# Patient Record
Sex: MALE | Race: WHITE | Employment: OTHER | URBAN - METROPOLITAN AREA
[De-identification: names, ages, dates, MRNs, and addresses within clinical notes are randomized per-mention and may not be internally consistent; named-entity substitution may affect disease eponyms.]

---

## 2017-02-03 ENCOUNTER — HOSPITAL ENCOUNTER (OUTPATIENT)
Dept: PREADMISSION TESTING | Age: 67
Discharge: HOME | End: 2017-02-03
Payer: MEDICARE

## 2017-02-03 DIAGNOSIS — M75.102 ROTATOR CUFF SYNDROME OF LEFT SHOULDER: ICD-10-CM

## 2017-02-03 LAB
ATRIAL RATE: 61 BPM
CALCULATED P AXIS, ECG09: 28 DEGREES
CALCULATED R AXIS, ECG10: 49 DEGREES
CALCULATED T AXIS, ECG11: 131 DEGREES
DIAGNOSIS, 93000: NORMAL
P-R INTERVAL, ECG05: 204 MS
Q-T INTERVAL, ECG07: 408 MS
QRS DURATION, ECG06: 82 MS
QTC CALCULATION (BEZET), ECG08: 410 MS
VENTRICULAR RATE, ECG03: 61 BPM

## 2017-02-03 PROCEDURE — 93005 ELECTROCARDIOGRAM TRACING: CPT

## 2017-02-21 ENCOUNTER — HOSPITAL ENCOUNTER (OUTPATIENT)
Dept: NON INVASIVE DIAGNOSTICS | Age: 67
Discharge: HOME | End: 2017-02-21
Attending: INTERNAL MEDICINE
Payer: MEDICARE

## 2017-02-21 DIAGNOSIS — Z01.810 PRE-OPERATIVE CARDIOVASCULAR EXAMINATION: ICD-10-CM

## 2017-02-21 PROCEDURE — 93306 TTE W/DOPPLER COMPLETE: CPT

## 2017-12-28 ENCOUNTER — HOSPITAL ENCOUNTER (EMERGENCY)
Age: 67
Discharge: HOME | End: 2017-12-28
Attending: EMERGENCY MEDICINE | Admitting: EMERGENCY MEDICINE
Payer: MEDICARE

## 2017-12-28 VITALS
SYSTOLIC BLOOD PRESSURE: 132 MMHG | HEART RATE: 106 BPM | HEIGHT: 70 IN | BODY MASS INDEX: 24.34 KG/M2 | WEIGHT: 170 LBS | TEMPERATURE: 97.7 F | OXYGEN SATURATION: 100 % | RESPIRATION RATE: 18 BRPM | DIASTOLIC BLOOD PRESSURE: 65 MMHG

## 2017-12-28 DIAGNOSIS — L02.512 ABSCESS OF FINGER OF LEFT HAND: Primary | ICD-10-CM

## 2017-12-28 PROCEDURE — 87077 CULTURE AEROBIC IDENTIFY: CPT | Performed by: EMERGENCY MEDICINE

## 2017-12-28 PROCEDURE — 74011250637 HC RX REV CODE- 250/637: Performed by: EMERGENCY MEDICINE

## 2017-12-28 PROCEDURE — 87070 CULTURE OTHR SPECIMN AEROBIC: CPT | Performed by: EMERGENCY MEDICINE

## 2017-12-28 PROCEDURE — 74011000250 HC RX REV CODE- 250: Performed by: EMERGENCY MEDICINE

## 2017-12-28 PROCEDURE — 99283 EMERGENCY DEPT VISIT LOW MDM: CPT

## 2017-12-28 PROCEDURE — 75810000289 HC I&D ABSCESS SIMP/COMP/MULT

## 2017-12-28 PROCEDURE — 87186 SC STD MICRODIL/AGAR DIL: CPT | Performed by: EMERGENCY MEDICINE

## 2017-12-28 RX ORDER — ESOMEPRAZOLE MAGNESIUM 40 MG/1
CAPSULE, DELAYED RELEASE ORAL AS NEEDED
COMMUNITY

## 2017-12-28 RX ORDER — ATORVASTATIN CALCIUM 20 MG/1
20 TABLET, FILM COATED ORAL DAILY
Status: ON HOLD | COMMUNITY
End: 2022-08-09

## 2017-12-28 RX ORDER — DOXYCYCLINE 100 MG/1
100 CAPSULE ORAL 2 TIMES DAILY
Qty: 14 CAP | Refills: 0 | Status: SHIPPED | OUTPATIENT
Start: 2017-12-28 | End: 2018-01-04

## 2017-12-28 RX ORDER — FLUTICASONE PROPIONATE 50 MCG
2 SPRAY, SUSPENSION (ML) NASAL DAILY
Status: ON HOLD | COMMUNITY
End: 2022-08-08 | Stop reason: CLARIF

## 2017-12-28 RX ORDER — DOXYCYCLINE 100 MG/1
100 CAPSULE ORAL
Status: COMPLETED | OUTPATIENT
Start: 2017-12-28 | End: 2017-12-28

## 2017-12-28 RX ORDER — DEXTROMETHORPHAN HYDROBROMIDE, GUAIFENESIN 5; 100 MG/5ML; MG/5ML
650 LIQUID ORAL EVERY 8 HOURS
COMMUNITY

## 2017-12-28 RX ORDER — LIDOCAINE HYDROCHLORIDE 10 MG/ML
10 INJECTION INFILTRATION; PERINEURAL ONCE
Status: COMPLETED | OUTPATIENT
Start: 2017-12-28 | End: 2017-12-28

## 2017-12-28 RX ORDER — HYDROCODONE BITARTRATE AND ACETAMINOPHEN 5; 325 MG/1; MG/1
1 TABLET ORAL
Qty: 12 TAB | Refills: 0 | Status: ON HOLD | OUTPATIENT
Start: 2017-12-28 | End: 2022-08-09

## 2017-12-28 RX ADMIN — LIDOCAINE HYDROCHLORIDE 10 ML: 10 INJECTION, SOLUTION INFILTRATION; PERINEURAL at 08:15

## 2017-12-28 RX ADMIN — DOXYCYCLINE 100 MG: 100 CAPSULE ORAL at 08:23

## 2017-12-28 NOTE — DISCHARGE INSTRUCTIONS

## 2017-12-28 NOTE — ED TRIAGE NOTES
Patient c/o LEFT pointer finger pain that started [place]. Swelling and redness noted to site. Patient reports noting clear discharge and have been using bacitracin.

## 2017-12-28 NOTE — ED PROVIDER NOTES
EMERGENCY DEPARTMENT HISTORY AND PHYSICAL EXAM    Date: 12/28/2017  Patient Name: Lisa Lopez    History of Presenting Illness     Chief Complaint   Patient presents with    Finger Pain         History Provided By: Patient    Chief Complaint: iindex finger pain  Duration: 4 Days  Timing:  Worsening  Location: left  Quality: Aching and throbbinf  Severity: 4 out of 10  Modifying Factors: Pain is worse with movement. Associated Symptoms: edema, erythema, and clear drainage    Additional History (Context):   7:55 AM  Lisa Lopez is a 69 y.o. male who presents to the emergency department C/O 4/10 throbbing,aching left index finger pain onset 4 days ago. Patient noticed a \"bump\" on left index finger 4 days ago that has gradually worsened. Pain is worse with movement. Took bacitracin. Associated symptoms include edema, erythema, and clear drainage. Pshx of rotator cuff surgery on left shoulder. NKDA. Pt denies any other Sx or complaints. PCP: Saw Jimenez MD    Past History     Past Medical History:  History reviewed. No pertinent past medical history. Past Surgical History:  Past Surgical History:   Procedure Laterality Date    HX BACK SURGERY         Family History:  History reviewed. No pertinent family history. Social History:  Social History   Substance Use Topics    Smoking status: Never Smoker    Smokeless tobacco: Never Used    Alcohol use Yes       Allergies:  No Known Allergies      Review of Systems   Review of Systems   Musculoskeletal: Positive for myalgias (left index finger pain). Skin: Positive for color change (erythema). (+) edema  (+) clear drainage   All other systems reviewed and are negative. Physical Exam     Vitals:    12/28/17 0803   BP: 132/65   Pulse: (!) 106   Resp: 18   Temp: 97.7 °F (36.5 °C)   SpO2: 100%   Weight: 77.1 kg (170 lb)   Height: 5' 10\" (1.778 m)     Physical Exam   Constitutional: He is oriented to person, place, and time.  He appears well-developed and well-nourished. HENT:   Head: Normocephalic and atraumatic. Eyes: Pupils are equal, round, and reactive to light. Neck: Neck supple. Cardiovascular: Normal rate, regular rhythm, S1 normal, S2 normal and normal heart sounds. Pulmonary/Chest: Breath sounds normal. No respiratory distress. He has no wheezes. He has no rales. He exhibits no tenderness. Abdominal: Soft. He exhibits no distension and no mass. There is no tenderness. There is no guarding. Musculoskeletal: Normal range of motion. He exhibits no edema or tenderness. Left hand: He exhibits normal capillary refill. Neurological: He is alert and oriented to person, place, and time. No cranial nerve deficit. Skin: No rash noted. Left index finger- small abscess . 25cm on dorsal proximal. Normal capillary refill. Pin point central area that is pustular   Psychiatric: He has a normal mood and affect. His behavior is normal. Thought content normal.   Nursing note and vitals reviewed. Diagnostic Study Results     Labs -   No results found for this or any previous visit (from the past 12 hour(s)). Radiologic Studies -   No orders to display     CT Results  (Last 48 hours)    None        CXR Results  (Last 48 hours)    None          Medications given in the ED-  Medications   lidocaine (XYLOCAINE) 10 mg/mL (1 %) injection 10 mL (10 mL IntraDERMal Given 12/28/17 8215)   doxycycline (MONODOX) capsule 100 mg (100 mg Oral Given 12/28/17 5208)         Medical Decision Making   I am the first provider for this patient. I reviewed the vital signs, available nursing notes, past medical history, past surgical history, family history and social history. Vital Signs-Reviewed the patient's vital signs.     Pulse Oximetry Analysis - 100% on RA      Records Reviewed: Nursing Notes    Procedures:  I&D Abcess Simple  Date/Time: 12/28/2017 8:05 AM  Performed by: Amna Gresham  Authorized by: Amna Gresham Consent:     Consent obtained:  Verbal    Consent given by:  Patient    Risks discussed:  Bleeding, damage to other organs, infection, incomplete drainage and pain    Alternatives discussed:  No treatment  Location:     Type:  Abscess    Size:  .25 cm    Location:  Upper extremity    Upper extremity location:  Finger    Finger location:  L index finger  Procedure type:     Complexity:  Simple  Procedure details:     Needle aspiration: no      Incision types:  Stab incision    Incision depth:  Dermal    Scalpel blade:  11    Wound management:  Irrigated with saline    Drainage:  Purulent    Drainage amount:  Scant    Wound treatment:  Wound left open    Packing materials:  None  Post-procedure details:     Patient tolerance of procedure: Tolerated well, no immediate complications  Comments:      Used ½ cc of 1% lidocaine w/o epi. Culture was sent. ED Course:   7:55 AM Initial assessment performed. The patients presenting problems have been discussed, and they are in agreement with the care plan formulated and outlined with them. I have encouraged them to ask questions as they arise throughout their visit. Diagnosis and Disposition       DISCHARGE NOTE:  8:20 AM  Ulices Mccann's  results have been reviewed with him. He has been counseled regarding his diagnosis, treatment, and plan. He verbally conveys understanding and agreement of the signs, symptoms, diagnosis, treatment and prognosis and additionally agrees to follow up as discussed. He also agrees with the care-plan and conveys that all of his questions have been answered. I have also provided discharge instructions for him that include: educational information regarding their diagnosis and treatment, and list of reasons why they would want to return to the ED prior to their follow-up appointment, should his condition change. He has been provided with education for proper emergency department utilization. CLINICAL IMPRESSION:    1. Abscess of finger of left hand        PLAN:  1. D/C Home  2. Discharge Medication List as of 12/28/2017  8:17 AM      START taking these medications    Details   doxycycline (MONODOX) 100 mg capsule Take 1 Cap by mouth two (2) times a day for 7 days. , Print, Disp-14 Cap, R-0      HYDROcodone-acetaminophen (NORCO) 5-325 mg per tablet Take 1 Tab by mouth every four (4) hours as needed for Pain. Max Daily Amount: 6 Tabs., Print, Disp-12 Tab, R-0         CONTINUE these medications which have NOT CHANGED    Details   atorvastatin (LIPITOR) 20 mg tablet Take  by mouth daily. , Historical Med      fluticasone (FLONASE) 50 mcg/actuation nasal spray 2 Sprays by Both Nostrils route daily. , Historical Med      esomeprazole (NEXIUM) 40 mg capsule Take  by mouth as needed., Historical Med      acetaminophen (TYLENOL ARTHRITIS PAIN) 650 mg TbER Take 650 mg by mouth every eight (8) hours. , Historical Med           3. Follow-up Information     Follow up With Details Comments Contact Info    Mila Muñoz MD Schedule an appointment as soon as possible for a visit For primary care follow up [address]      THE [facility] EMERGENCY DEPT Go to As needed, as symptoms worsen [address] [place] [id number]  [phone]        _______________________________    Attestations: This note is prepared by Donald Muniz, acting as Scribe for [employer], MD.    [employer], MD:  The scribe's documentation has been prepared under my direction and personally reviewed by me in its entirety.   I confirm that the note above accurately reflects all work, treatment, procedures, and medical decision making performed by me.  _______________________________

## 2017-12-28 NOTE — ED NOTES
Patient given & reviewed discharge instructions. Patient return verbal understanding. Armband discarded & shredded appropriately.

## 2017-12-30 LAB
BACTERIA SPEC CULT: ABNORMAL
GRAM STN SPEC: ABNORMAL
GRAM STN SPEC: ABNORMAL
SERVICE CMNT-IMP: ABNORMAL

## 2017-12-30 NOTE — CALL BACK NOTE
4:52 PM  12/30/2017    On doxy for skin infection. Sensitive to tetracycline per C&S. On appropriate tx.      Yady Colin PA-C

## 2022-08-06 ENCOUNTER — HOSPITAL ENCOUNTER (INPATIENT)
Age: 72
LOS: 5 days | Discharge: HOME | DRG: 247 | End: 2022-08-11
Attending: EMERGENCY MEDICINE | Admitting: INTERNAL MEDICINE
Payer: MEDICARE

## 2022-08-06 ENCOUNTER — APPOINTMENT (OUTPATIENT)
Dept: GENERAL RADIOLOGY | Age: 72
DRG: 247 | End: 2022-08-06
Attending: EMERGENCY MEDICINE
Payer: MEDICARE

## 2022-08-06 DIAGNOSIS — R07.9 CHEST PAIN, UNSPECIFIED TYPE: ICD-10-CM

## 2022-08-06 DIAGNOSIS — I21.4 NSTEMI (NON-ST ELEVATED MYOCARDIAL INFARCTION) (HCC): Primary | ICD-10-CM

## 2022-08-06 PROBLEM — R73.01 IMPAIRED FASTING GLUCOSE: Status: ACTIVE | Noted: 2022-08-06

## 2022-08-06 PROBLEM — L57.0 ACTINIC KERATOSIS: Status: ACTIVE | Noted: 2022-08-06

## 2022-08-06 PROBLEM — Z19.1 HORMONE SENSITIVE PROSTATE CANCER (HCC): Status: RESOLVED | Noted: 2022-08-06 | Resolved: 2022-08-06

## 2022-08-06 PROBLEM — K21.9 ESOPHAGEAL REFLUX: Status: ACTIVE | Noted: 2022-08-06

## 2022-08-06 PROBLEM — Z19.1 HORMONE SENSITIVE PROSTATE CANCER (HCC): Status: ACTIVE | Noted: 2022-08-06

## 2022-08-06 PROBLEM — I82.409 DVT (DEEP VENOUS THROMBOSIS) (HCC): Status: ACTIVE | Noted: 2022-08-06

## 2022-08-06 PROBLEM — C61 HORMONE SENSITIVE PROSTATE CANCER (HCC): Status: RESOLVED | Noted: 2022-08-06 | Resolved: 2022-08-06

## 2022-08-06 PROBLEM — D75.1 POLYCYTHEMIA: Status: ACTIVE | Noted: 2022-08-06

## 2022-08-06 PROBLEM — E78.5 HYPERLIPIDEMIA: Status: ACTIVE | Noted: 2022-08-06

## 2022-08-06 PROBLEM — C61 PROSTATE CANCER (HCC): Status: ACTIVE | Noted: 2022-08-06

## 2022-08-06 PROBLEM — C61 HORMONE SENSITIVE PROSTATE CANCER (HCC): Status: ACTIVE | Noted: 2022-08-06

## 2022-08-06 LAB
ALBUMIN SERPL-MCNC: 3.9 G/DL (ref 3.4–5)
ALBUMIN/GLOB SERPL: 1.1 {RATIO} (ref 0.8–1.7)
ALP SERPL-CCNC: 38 U/L (ref 45–117)
ALT SERPL-CCNC: 43 U/L (ref 16–61)
ANION GAP SERPL CALC-SCNC: 7 MMOL/L (ref 3–18)
APTT PPP: 27.2 SEC (ref 23–36.4)
AST SERPL-CCNC: 70 U/L (ref 10–38)
BASOPHILS # BLD: 0.1 K/UL (ref 0–0.1)
BASOPHILS NFR BLD: 1 % (ref 0–2)
BILIRUB SERPL-MCNC: 0.5 MG/DL (ref 0.2–1)
BUN SERPL-MCNC: 21 MG/DL (ref 7–18)
BUN/CREAT SERPL: 17 (ref 12–20)
CALCIUM SERPL-MCNC: 9.1 MG/DL (ref 8.5–10.1)
CHLORIDE SERPL-SCNC: 102 MMOL/L (ref 100–111)
CO2 SERPL-SCNC: 27 MMOL/L (ref 21–32)
COVID-19 RAPID TEST, COVR: NOT DETECTED
CREAT SERPL-MCNC: 1.27 MG/DL (ref 0.6–1.3)
D DIMER PPP FEU-MCNC: <0.27 UG/ML(FEU)
DIFFERENTIAL METHOD BLD: ABNORMAL
EOSINOPHIL # BLD: 0.1 K/UL (ref 0–0.4)
EOSINOPHIL NFR BLD: 1 % (ref 0–5)
ERYTHROCYTE [DISTWIDTH] IN BLOOD BY AUTOMATED COUNT: 13.1 % (ref 11.6–14.5)
GLOBULIN SER CALC-MCNC: 3.7 G/DL (ref 2–4)
GLUCOSE SERPL-MCNC: 107 MG/DL (ref 74–99)
HCT VFR BLD AUTO: 50.4 % (ref 36–48)
HGB BLD-MCNC: 17.1 G/DL (ref 13–16)
IMM GRANULOCYTES # BLD AUTO: 0 K/UL (ref 0–0.04)
IMM GRANULOCYTES NFR BLD AUTO: 0 % (ref 0–0.5)
INR PPP: 1 (ref 0.8–1.2)
LYMPHOCYTES # BLD: 1.8 K/UL (ref 0.9–3.6)
LYMPHOCYTES NFR BLD: 21 % (ref 21–52)
MCH RBC QN AUTO: 30.9 PG (ref 24–34)
MCHC RBC AUTO-ENTMCNC: 33.9 G/DL (ref 31–37)
MCV RBC AUTO: 91.1 FL (ref 78–100)
MONOCYTES # BLD: 0.8 K/UL (ref 0.05–1.2)
MONOCYTES NFR BLD: 10 % (ref 3–10)
NEUTS SEG # BLD: 5.6 K/UL (ref 1.8–8)
NEUTS SEG NFR BLD: 67 % (ref 40–73)
NRBC # BLD: 0 K/UL (ref 0–0.01)
NRBC BLD-RTO: 0 PER 100 WBC
PLATELET # BLD AUTO: 162 K/UL (ref 135–420)
PMV BLD AUTO: 9.5 FL (ref 9.2–11.8)
POTASSIUM SERPL-SCNC: 3.9 MMOL/L (ref 3.5–5.5)
PROT SERPL-MCNC: 7.6 G/DL (ref 6.4–8.2)
PROTHROMBIN TIME: 13.6 SEC (ref 11.5–15.2)
RBC # BLD AUTO: 5.53 M/UL (ref 4.35–5.65)
SODIUM SERPL-SCNC: 136 MMOL/L (ref 136–145)
SOURCE, COVRS: NORMAL
TROPONIN-HIGH SENSITIVITY: 3355 NG/L (ref 0–78)
TSH SERPL DL<=0.05 MIU/L-ACNC: 0.84 UIU/ML (ref 0.36–3.74)
WBC # BLD AUTO: 8.4 K/UL (ref 4.6–13.2)

## 2022-08-06 PROCEDURE — 80053 COMPREHEN METABOLIC PANEL: CPT

## 2022-08-06 PROCEDURE — 85025 COMPLETE CBC W/AUTO DIFF WBC: CPT

## 2022-08-06 PROCEDURE — 74011250636 HC RX REV CODE- 250/636: Performed by: INTERNAL MEDICINE

## 2022-08-06 PROCEDURE — 87635 SARS-COV-2 COVID-19 AMP PRB: CPT

## 2022-08-06 PROCEDURE — 65270000046 HC RM TELEMETRY

## 2022-08-06 PROCEDURE — 74011250637 HC RX REV CODE- 250/637: Performed by: EMERGENCY MEDICINE

## 2022-08-06 PROCEDURE — 71045 X-RAY EXAM CHEST 1 VIEW: CPT

## 2022-08-06 PROCEDURE — 85379 FIBRIN DEGRADATION QUANT: CPT

## 2022-08-06 PROCEDURE — 84484 ASSAY OF TROPONIN QUANT: CPT

## 2022-08-06 PROCEDURE — 93005 ELECTROCARDIOGRAM TRACING: CPT

## 2022-08-06 PROCEDURE — 74011250637 HC RX REV CODE- 250/637: Performed by: INTERNAL MEDICINE

## 2022-08-06 PROCEDURE — 85730 THROMBOPLASTIN TIME PARTIAL: CPT

## 2022-08-06 PROCEDURE — 74011250636 HC RX REV CODE- 250/636: Performed by: EMERGENCY MEDICINE

## 2022-08-06 PROCEDURE — 99285 EMERGENCY DEPT VISIT HI MDM: CPT

## 2022-08-06 PROCEDURE — 85610 PROTHROMBIN TIME: CPT

## 2022-08-06 PROCEDURE — 84443 ASSAY THYROID STIM HORMONE: CPT

## 2022-08-06 RX ORDER — METOPROLOL SUCCINATE 25 MG/1
25 TABLET, EXTENDED RELEASE ORAL
Status: COMPLETED | OUTPATIENT
Start: 2022-08-06 | End: 2022-08-06

## 2022-08-06 RX ORDER — HEPARIN SODIUM 1000 [USP'U]/ML
4000 INJECTION, SOLUTION INTRAVENOUS; SUBCUTANEOUS ONCE
Status: COMPLETED | OUTPATIENT
Start: 2022-08-06 | End: 2022-08-07

## 2022-08-06 RX ORDER — ADHESIVE BANDAGE
30 BANDAGE TOPICAL DAILY PRN
Status: DISCONTINUED | OUTPATIENT
Start: 2022-08-06 | End: 2022-08-11 | Stop reason: HOSPADM

## 2022-08-06 RX ORDER — ONDANSETRON 4 MG/1
4 TABLET, ORALLY DISINTEGRATING ORAL
Status: DISCONTINUED | OUTPATIENT
Start: 2022-08-06 | End: 2022-08-11 | Stop reason: HOSPADM

## 2022-08-06 RX ORDER — DOCUSATE SODIUM 100 MG/1
100 CAPSULE, LIQUID FILLED ORAL 2 TIMES DAILY
Status: DISCONTINUED | OUTPATIENT
Start: 2022-08-06 | End: 2022-08-11 | Stop reason: HOSPADM

## 2022-08-06 RX ORDER — HEPARIN SODIUM 10000 [USP'U]/100ML
12-25 INJECTION, SOLUTION INTRAVENOUS
Status: DISCONTINUED | OUTPATIENT
Start: 2022-08-06 | End: 2022-08-06

## 2022-08-06 RX ORDER — SODIUM CHLORIDE 0.9 % (FLUSH) 0.9 %
5-40 SYRINGE (ML) INJECTION AS NEEDED
Status: DISCONTINUED | OUTPATIENT
Start: 2022-08-06 | End: 2022-08-11 | Stop reason: HOSPADM

## 2022-08-06 RX ORDER — ATORVASTATIN CALCIUM 20 MG/1
20 TABLET, FILM COATED ORAL
Status: DISCONTINUED | OUTPATIENT
Start: 2022-08-06 | End: 2022-08-08

## 2022-08-06 RX ORDER — ONDANSETRON 2 MG/ML
4 INJECTION INTRAMUSCULAR; INTRAVENOUS
Status: DISCONTINUED | OUTPATIENT
Start: 2022-08-06 | End: 2022-08-11 | Stop reason: HOSPADM

## 2022-08-06 RX ORDER — MORPHINE SULFATE 4 MG/ML
4 INJECTION INTRAVENOUS
Status: COMPLETED | OUTPATIENT
Start: 2022-08-06 | End: 2022-08-06

## 2022-08-06 RX ORDER — POLYETHYLENE GLYCOL 3350 17 G/17G
17 POWDER, FOR SOLUTION ORAL DAILY PRN
Status: DISCONTINUED | OUTPATIENT
Start: 2022-08-06 | End: 2022-08-11 | Stop reason: HOSPADM

## 2022-08-06 RX ORDER — ACETAMINOPHEN 650 MG/1
650 SUPPOSITORY RECTAL
Status: DISCONTINUED | OUTPATIENT
Start: 2022-08-06 | End: 2022-08-09

## 2022-08-06 RX ORDER — SODIUM CHLORIDE 0.9 % (FLUSH) 0.9 %
5-40 SYRINGE (ML) INJECTION EVERY 8 HOURS
Status: DISCONTINUED | OUTPATIENT
Start: 2022-08-06 | End: 2022-08-11 | Stop reason: HOSPADM

## 2022-08-06 RX ORDER — METOPROLOL TARTRATE 25 MG/1
25 TABLET, FILM COATED ORAL EVERY 12 HOURS
Status: DISCONTINUED | OUTPATIENT
Start: 2022-08-07 | End: 2022-08-08

## 2022-08-06 RX ORDER — ACETAMINOPHEN 325 MG/1
650 TABLET ORAL
Status: DISCONTINUED | OUTPATIENT
Start: 2022-08-06 | End: 2022-08-09

## 2022-08-06 RX ORDER — GUAIFENESIN 100 MG/5ML
81 LIQUID (ML) ORAL DAILY
Status: DISCONTINUED | OUTPATIENT
Start: 2022-08-06 | End: 2022-08-09

## 2022-08-06 RX ORDER — HEPARIN SODIUM 10000 [USP'U]/100ML
12-25 INJECTION, SOLUTION INTRAVENOUS
Status: DISCONTINUED | OUTPATIENT
Start: 2022-08-07 | End: 2022-08-09

## 2022-08-06 RX ADMIN — METOPROLOL SUCCINATE 25 MG: 25 TABLET, EXTENDED RELEASE ORAL at 21:30

## 2022-08-06 RX ADMIN — MORPHINE SULFATE 4 MG: 4 INJECTION INTRAVENOUS at 21:31

## 2022-08-06 RX ADMIN — HEPARIN SODIUM 18 UNITS/KG/HR: 10000 INJECTION, SOLUTION INTRAVENOUS at 23:47

## 2022-08-06 RX ADMIN — ASPIRIN 81 MG: 81 TABLET, CHEWABLE ORAL at 23:49

## 2022-08-06 RX ADMIN — DOCUSATE SODIUM 100 MG: 100 CAPSULE ORAL at 23:49

## 2022-08-06 NOTE — ED TRIAGE NOTES
C/O midsternal CP/ bilat arm soreness and 2 episodes of profuse sweating x 2-3 days. Endorses working in the heat today. Endorses increased fatigue with significant life event occurring this week. Hx DVT TO L calf-taking Eliquis. Febrile in triage, took 3 81mg ASA PTA.

## 2022-08-06 NOTE — Clinical Note
Aspirin Registry Question:   Aspirin was discussed with physician prior to the procedure. Aspirin was not given prior to the procedure. AS PER , BEING GIVEN.

## 2022-08-06 NOTE — Clinical Note
TRANSFER - IN REPORT:     Verbal report received from: holding rm. rn.     Report consisted of patient's Situation, Background, Assessment and   Recommendations(SBAR). Opportunity for questions and clarification was provided. Assessment completed upon patient's arrival to unit and care assumed. Patient transported with a Registered Nurse and [address] [place] [address] / [facility] my[facility].

## 2022-08-06 NOTE — Clinical Note
Status[de-identified] INPATIENT [101]   Type of Bed: Telemetry [19]   Cardiac Monitoring Required?: Yes   Inpatient Hospitalization Certified Necessary for the Following Reasons: 3.  Patient receiving treatment that can only be provided in an inpatient setting (further clarification in H&P documentation)   Admitting Diagnosis: Chest pain [750186]   Admitting Physician: Nellie Whiteside [[id number]]   Attending Physician: Nellie Whiteside [[id number]]   Estimated Length of Stay: 2 Midnights   Discharge Plan[de-identified] Home with Office Follow-up

## 2022-08-06 NOTE — Clinical Note
Contrast Dose Calculator:   Patient's age: 74.   Patient's sex: Male. Patient weight (kg) = 82.7. Creatinine level (mg/dL) = 1.12. Creatinine clearance (mL/min): 68.   Max Contrast dose per Creatinine Cl calculator = 153 mL.

## 2022-08-06 NOTE — Clinical Note
TRANSFER - OUT REPORT:     Verbal report given to: Domo Thomas RN. Report consisted of patient's Situation, Background, Assessment and   Recommendations(SBAR). Opportunity for questions and clarification was provided. Patient transported with a Registered Nurse and [address] [place] [address] / [facility].

## 2022-08-07 ENCOUNTER — APPOINTMENT (OUTPATIENT)
Dept: NON INVASIVE DIAGNOSTICS | Age: 72
DRG: 247 | End: 2022-08-07
Attending: INTERNAL MEDICINE
Payer: MEDICARE

## 2022-08-07 ENCOUNTER — APPOINTMENT (OUTPATIENT)
Dept: VASCULAR SURGERY | Age: 72
DRG: 247 | End: 2022-08-07
Attending: INTERNAL MEDICINE
Payer: MEDICARE

## 2022-08-07 LAB
ALBUMIN SERPL-MCNC: 3.4 G/DL (ref 3.4–5)
ALBUMIN/GLOB SERPL: 1 {RATIO} (ref 0.8–1.7)
ALP SERPL-CCNC: 30 U/L (ref 45–117)
ALT SERPL-CCNC: 40 U/L (ref 16–61)
ANION GAP SERPL CALC-SCNC: 7 MMOL/L (ref 3–18)
APTT PPP: 144.3 SEC (ref 23–36.4)
APTT PPP: 95.8 SEC (ref 23–36.4)
AST SERPL-CCNC: 90 U/L (ref 10–38)
ATRIAL RATE: 69 BPM
BASOPHILS # BLD: 0.1 K/UL (ref 0–0.1)
BASOPHILS NFR BLD: 1 % (ref 0–2)
BILIRUB SERPL-MCNC: 0.6 MG/DL (ref 0.2–1)
BUN SERPL-MCNC: 21 MG/DL (ref 7–18)
BUN/CREAT SERPL: 19 (ref 12–20)
CALCIUM SERPL-MCNC: 8.7 MG/DL (ref 8.5–10.1)
CALCULATED P AXIS, ECG09: 33 DEGREES
CALCULATED R AXIS, ECG10: 6 DEGREES
CALCULATED T AXIS, ECG11: 144 DEGREES
CHLORIDE SERPL-SCNC: 103 MMOL/L (ref 100–111)
CHOLEST SERPL-MCNC: 239 MG/DL
CO2 SERPL-SCNC: 28 MMOL/L (ref 21–32)
CREAT SERPL-MCNC: 1.12 MG/DL (ref 0.6–1.3)
DIAGNOSIS, 93000: NORMAL
DIFFERENTIAL METHOD BLD: ABNORMAL
ECHO AO ASC DIAM: 4.3 CM
ECHO AO ASCENDING AORTA INDEX: 2.17 CM/M2
ECHO AO ROOT DIAM: 3.9 CM
ECHO AO ROOT INDEX: 1.97 CM/M2
ECHO AV AREA PEAK VELOCITY: 3.9 CM2
ECHO AV AREA VTI: 3.7 CM2
ECHO AV AREA/BSA PEAK VELOCITY: 2 CM2/M2
ECHO AV AREA/BSA VTI: 1.9 CM2/M2
ECHO AV MEAN GRADIENT: 2 MMHG
ECHO AV MEAN VELOCITY: 0.7 M/S
ECHO AV PEAK GRADIENT: 3 MMHG
ECHO AV PEAK VELOCITY: 0.9 M/S
ECHO AV VELOCITY RATIO: 1
ECHO AV VTI: 17.1 CM
ECHO LA DIAMETER INDEX: 1.92 CM/M2
ECHO LA DIAMETER: 3.8 CM
ECHO LA TO AORTIC ROOT RATIO: 0.97
ECHO LA VOL 2C: 44 ML (ref 18–58)
ECHO LA VOL 4C: 46 ML (ref 18–58)
ECHO LA VOL BP: 48 ML (ref 18–58)
ECHO LA VOL/BSA BIPLANE: 24 ML/M2 (ref 16–34)
ECHO LA VOLUME AREA LENGTH: 50 ML
ECHO LA VOLUME INDEX A2C: 22 ML/M2 (ref 16–34)
ECHO LA VOLUME INDEX A4C: 23 ML/M2 (ref 16–34)
ECHO LA VOLUME INDEX AREA LENGTH: 25 ML/M2 (ref 16–34)
ECHO LV E' LATERAL VELOCITY: 8 CM/S
ECHO LV E' SEPTAL VELOCITY: 6 CM/S
ECHO LV EDV A2C: 87 ML
ECHO LV EDV A4C: 81 ML
ECHO LV EDV BP: 84 ML (ref 67–155)
ECHO LV EDV INDEX A4C: 41 ML/M2
ECHO LV EDV INDEX BP: 42 ML/M2
ECHO LV EDV NDEX A2C: 44 ML/M2
ECHO LV EJECTION FRACTION A2C: 41 %
ECHO LV EJECTION FRACTION A4C: 44 %
ECHO LV EJECTION FRACTION BIPLANE: 42 % (ref 55–100)
ECHO LV ESV A2C: 52 ML
ECHO LV ESV A4C: 46 ML
ECHO LV ESV BP: 49 ML (ref 22–58)
ECHO LV ESV INDEX A2C: 26 ML/M2
ECHO LV ESV INDEX A4C: 23 ML/M2
ECHO LV ESV INDEX BP: 25 ML/M2
ECHO LV FRACTIONAL SHORTENING: 31 % (ref 28–44)
ECHO LV INTERNAL DIMENSION DIASTOLE INDEX: 2.42 CM/M2
ECHO LV INTERNAL DIMENSION DIASTOLIC: 4.8 CM (ref 4.2–5.9)
ECHO LV INTERNAL DIMENSION SYSTOLIC INDEX: 1.67 CM/M2
ECHO LV INTERNAL DIMENSION SYSTOLIC: 3.3 CM
ECHO LV IVSD: 0.9 CM (ref 0.6–1)
ECHO LV MASS 2D: 181.9 G (ref 88–224)
ECHO LV MASS INDEX 2D: 91.9 G/M2 (ref 49–115)
ECHO LV POSTERIOR WALL DIASTOLIC: 1.2 CM (ref 0.6–1)
ECHO LV RELATIVE WALL THICKNESS RATIO: 0.5
ECHO LVOT AREA: 3.8 CM2
ECHO LVOT AV VTI INDEX: 0.96
ECHO LVOT DIAM: 2.2 CM
ECHO LVOT MEAN GRADIENT: 2 MMHG
ECHO LVOT PEAK GRADIENT: 3 MMHG
ECHO LVOT PEAK VELOCITY: 0.9 M/S
ECHO LVOT STROKE VOLUME INDEX: 31.5 ML/M2
ECHO LVOT SV: 62.3 ML
ECHO LVOT VTI: 16.4 CM
ECHO MV A VELOCITY: 0.42 M/S
ECHO MV E DECELERATION TIME (DT): 168.9 MS
ECHO MV E VELOCITY: 0.57 M/S
ECHO MV E/A RATIO: 1.36
ECHO MV E/E' LATERAL: 7.13
ECHO MV E/E' RATIO (AVERAGED): 8.31
ECHO MV E/E' SEPTAL: 9.5
ECHO RV FREE WALL PEAK S': 10 CM/S
ECHO RV INTERNAL DIMENSION: 2.9 CM
ECHO RV TAPSE: 1.2 CM (ref 1.7–?)
EOSINOPHIL # BLD: 0.2 K/UL (ref 0–0.4)
EOSINOPHIL NFR BLD: 2 % (ref 0–5)
ERYTHROCYTE [DISTWIDTH] IN BLOOD BY AUTOMATED COUNT: 13.2 % (ref 11.6–14.5)
GLOBULIN SER CALC-MCNC: 3.3 G/DL (ref 2–4)
GLUCOSE SERPL-MCNC: 88 MG/DL (ref 74–99)
HCT VFR BLD AUTO: 47.6 % (ref 36–48)
HDLC SERPL-MCNC: 45 MG/DL (ref 40–60)
HDLC SERPL: 5.3 {RATIO} (ref 0–5)
HGB BLD-MCNC: 16.1 G/DL (ref 13–16)
IMM GRANULOCYTES # BLD AUTO: 0 K/UL (ref 0–0.04)
IMM GRANULOCYTES NFR BLD AUTO: 0 % (ref 0–0.5)
LDLC SERPL CALC-MCNC: 182.8 MG/DL (ref 0–100)
LIPID PROFILE,FLP: ABNORMAL
LYMPHOCYTES # BLD: 2.7 K/UL (ref 0.9–3.6)
LYMPHOCYTES NFR BLD: 29 % (ref 21–52)
MAGNESIUM SERPL-MCNC: 2.2 MG/DL (ref 1.6–2.6)
MCH RBC QN AUTO: 30.7 PG (ref 24–34)
MCHC RBC AUTO-ENTMCNC: 33.8 G/DL (ref 31–37)
MCV RBC AUTO: 90.7 FL (ref 78–100)
MONOCYTES # BLD: 0.8 K/UL (ref 0.05–1.2)
MONOCYTES NFR BLD: 9 % (ref 3–10)
NEUTS SEG # BLD: 5.5 K/UL (ref 1.8–8)
NEUTS SEG NFR BLD: 59 % (ref 40–73)
NRBC # BLD: 0 K/UL (ref 0–0.01)
NRBC BLD-RTO: 0 PER 100 WBC
P-R INTERVAL, ECG05: 246 MS
PLATELET # BLD AUTO: 171 K/UL (ref 135–420)
PMV BLD AUTO: 10.6 FL (ref 9.2–11.8)
POTASSIUM SERPL-SCNC: 4 MMOL/L (ref 3.5–5.5)
PROT SERPL-MCNC: 6.7 G/DL (ref 6.4–8.2)
Q-T INTERVAL, ECG07: 382 MS
QRS DURATION, ECG06: 86 MS
QTC CALCULATION (BEZET), ECG08: 409 MS
RBC # BLD AUTO: 5.25 M/UL (ref 4.35–5.65)
SODIUM SERPL-SCNC: 138 MMOL/L (ref 136–145)
TRIGL SERPL-MCNC: 56 MG/DL (ref ?–150)
TROPONIN-HIGH SENSITIVITY: 8749 NG/L (ref 0–78)
TROPONIN-HIGH SENSITIVITY: ABNORMAL NG/L (ref 0–78)
VENTRICULAR RATE, ECG03: 69 BPM
VLDLC SERPL CALC-MCNC: 11.2 MG/DL
WBC # BLD AUTO: 9.3 K/UL (ref 4.6–13.2)

## 2022-08-07 PROCEDURE — 84484 ASSAY OF TROPONIN QUANT: CPT

## 2022-08-07 PROCEDURE — 65270000046 HC RM TELEMETRY

## 2022-08-07 PROCEDURE — 85730 THROMBOPLASTIN TIME PARTIAL: CPT

## 2022-08-07 PROCEDURE — 74011250636 HC RX REV CODE- 250/636: Performed by: INTERNAL MEDICINE

## 2022-08-07 PROCEDURE — 83735 ASSAY OF MAGNESIUM: CPT

## 2022-08-07 PROCEDURE — 99222 1ST HOSP IP/OBS MODERATE 55: CPT | Performed by: INTERNAL MEDICINE

## 2022-08-07 PROCEDURE — 93306 TTE W/DOPPLER COMPLETE: CPT

## 2022-08-07 PROCEDURE — 80061 LIPID PANEL: CPT

## 2022-08-07 PROCEDURE — 74011250637 HC RX REV CODE- 250/637: Performed by: INTERNAL MEDICINE

## 2022-08-07 PROCEDURE — 74011000250 HC RX REV CODE- 250: Performed by: INTERNAL MEDICINE

## 2022-08-07 PROCEDURE — 74011250636 HC RX REV CODE- 250/636: Performed by: EMERGENCY MEDICINE

## 2022-08-07 PROCEDURE — 36415 COLL VENOUS BLD VENIPUNCTURE: CPT

## 2022-08-07 PROCEDURE — 93970 EXTREMITY STUDY: CPT

## 2022-08-07 PROCEDURE — 80053 COMPREHEN METABOLIC PANEL: CPT

## 2022-08-07 PROCEDURE — 85025 COMPLETE CBC W/AUTO DIFF WBC: CPT

## 2022-08-07 PROCEDURE — C9113 INJ PANTOPRAZOLE SODIUM, VIA: HCPCS | Performed by: INTERNAL MEDICINE

## 2022-08-07 RX ORDER — MORPHINE SULFATE 2 MG/ML
2 INJECTION, SOLUTION INTRAMUSCULAR; INTRAVENOUS
Status: DISCONTINUED | OUTPATIENT
Start: 2022-08-07 | End: 2022-08-11 | Stop reason: HOSPADM

## 2022-08-07 RX ADMIN — SODIUM CHLORIDE, PRESERVATIVE FREE 10 ML: 5 INJECTION INTRAVENOUS at 23:45

## 2022-08-07 RX ADMIN — DOCUSATE SODIUM 100 MG: 100 CAPSULE ORAL at 21:50

## 2022-08-07 RX ADMIN — HEPARIN SODIUM 18 UNITS/KG/HR: 10000 INJECTION, SOLUTION INTRAVENOUS at 19:22

## 2022-08-07 RX ADMIN — METOPROLOL TARTRATE 25 MG: 25 TABLET, FILM COATED ORAL at 08:52

## 2022-08-07 RX ADMIN — ACETAMINOPHEN 650 MG: 325 TABLET ORAL at 20:06

## 2022-08-07 RX ADMIN — METOPROLOL TARTRATE 25 MG: 25 TABLET, FILM COATED ORAL at 21:50

## 2022-08-07 RX ADMIN — SODIUM CHLORIDE, PRESERVATIVE FREE 10 ML: 5 INJECTION INTRAVENOUS at 13:27

## 2022-08-07 RX ADMIN — SODIUM CHLORIDE, PRESERVATIVE FREE 10 ML: 5 INJECTION INTRAVENOUS at 07:14

## 2022-08-07 RX ADMIN — SODIUM CHLORIDE, PRESERVATIVE FREE 10 ML: 5 INJECTION INTRAVENOUS at 00:03

## 2022-08-07 RX ADMIN — SODIUM CHLORIDE 40 MG: 9 INJECTION INTRAMUSCULAR; INTRAVENOUS; SUBCUTANEOUS at 08:52

## 2022-08-07 RX ADMIN — HEPARIN SODIUM 4000 UNITS: 1000 INJECTION INTRAVENOUS; SUBCUTANEOUS at 00:02

## 2022-08-07 RX ADMIN — DOCUSATE SODIUM 100 MG: 100 CAPSULE ORAL at 08:52

## 2022-08-07 RX ADMIN — SODIUM CHLORIDE, PRESERVATIVE FREE 10 ML: 5 INJECTION INTRAVENOUS at 00:04

## 2022-08-07 NOTE — PROGRESS NOTES
Reason for Admission:   Chest pain [R07.9]    PCP: Orlin Garcia MD   74 y.o.  male who has history of hyperlipidemia, secondary polycythemia, lower extremity DVT, BPH presents to the hospital after having 3 days of progressive anginal equivalent pain. Patient's dog  on Wednesday and on Thursday patient started to develop jaw pain left arm pain and Friday and Saturday had episodes of diaphoresis with chest pain. Patient came to the emergency room after his son who is a EMT urged him. There are currently no Active Isolations  MRSA                RUR Score:     low, 6%             Resources/supports,as identified by patient/family:   lives with wife and 39 yr old son      Barriers to discharge:  none             Plan for utilizing home health:    no                          Likelihood of readmission:   low         Transition of Care Plan:                    Initial assessment completed with patient. Cognitive status of patient: oriented to time, place, person and situation. Face sheet information confirmed:  yes. The patient designates spouse to participate in his discharge plan and to receive any needed information. This patient lives in a single family home with patient, son, and wife. Patient is able to navigate steps as needed. Prior to hospitalization, patient was considered to be independent with ADLs/IADLS : yes . I  The patient and wife will be available to transport patient home upon discharge. The patient already has none reported, and  no  medical equipment available in the home. Patient is not currently active with home health. This patient is on dialysis/days :no        Currently, the discharge plan is Home after cardiology consult and recommendation. The patient states that he can obtain his medications from the pharmacy, and take his medications as directed.     Patient's current insurance is Payor: [place] MED[place] / Plan: [place] MEDICARE PART A & B / Product Type: Medicare / Care Management Interventions  PCP Verified by CM:  Yes  Transition of Care Consult (CM Consult): Discharge Planning  Support Systems: Spouse/Significant Other, Child(jeanie)  Confirm Follow Up Transport: Family  The Plan for Transition of Care is Related to the Following Treatment Goals : NSTEMI  The Patient and/or Patient Representative was Provided with a Choice of Provider and Agrees with the Discharge Plan?: Yes  Name of the Patient Representative Who was Provided with a Choice of Provider and Agrees with the Discharge Plan: Kevin Chery, patient  Discharge Location  Patient Expects to be Discharged to[de-identified] Home with family assistance

## 2022-08-07 NOTE — H&P
History & Physical    Patient: Justa Alonzo MRN: [id number]  CSN: [id number]    YOB: 1950  Age: 74 y.o. Sex: male      DOA: 2022    Chief Complaint:   Chief Complaint   Patient presents with    Chest Pain          HPI:     Justa Alonzo is a 74 y.o.  male who has history of hyperlipidemia, secondary polycythemia, lower extremity DVT, BPH presents to the hospital after having 3 days of progressive anginal equivalent pain. Patient's dog  on Wednesday and on Thursday patient started to develop jaw pain left arm pain and Friday and Saturday had episodes of diaphoresis with chest pain. Patient came to the emergency room after his son who is a EMT urged him. He did note difficulty and diaphoresis putting up his fence as well [facility] 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 He denies smoking alcohol or drugs there is family history of cardiovascular disease namely rhythm issues and valvular issues. Patient denies any recent COVID-19 exposures he is not vaccinated from [facility]    Past Medical History:   Diagnosis Date    BPH (benign prostatic hyperplasia)     DVT (deep venous thrombosis) ([facility] [address]     Hyperlipemia        Past Surgical History:   Procedure Laterality Date    HX BACK SURGERY         History reviewed. No pertinent family history. Social History     Socioeconomic History    Marital status:    Tobacco Use    Smoking status: Never    Smokeless tobacco: Never   Substance and Sexual Activity    Alcohol use: Yes    Drug use: No       Prior to Admission medications    Medication Sig Start Date End Date Taking? Authorizing Provider   atorvastatin (LIPITOR) 20 mg tablet Take  by mouth daily.     Other, MD Rosendo   fluticasone (FLONASE) 50 mcg/actuation nasal spray 2 Sprays by Both Nostrils route daily. OtherRosendo MD   esomeprazole (NEXIUM) 40 mg capsule Take  by mouth as needed. OtherRosendo MD   acetaminophen (TYLENOL ARTHRITIS PAIN) 650 mg TbER Take 650 mg by mouth every eight (8) hours. Other, MD Rosendo   HYDROcodone-acetaminophen (NORCO) 5-325 mg per tablet Take 1 Tab by mouth every four (4) hours as needed for Pain. Max Daily Amount: 6 Tabs. 17   Rudi Munroe MD       No Known Allergies      Review of Systems  GENERAL: Patient alert, awake and oriented times 3, able to communicate full sentences and not in distress. HEENT: No change in vision, no earache, tinnitus, sore throat or sinus congestion. NECK: No pain or stiffness. PULMONARY: N+hortness of breath, cough or wheeze. Cardiovascular: no pnd or orthopnea, +CP  GASTROINTESTINAL: No abdominal pain, nausea, vomiting or diarrhea, melena or bright red blood per rectum. GENITOURINARY: +urinary frequency, urgency, hesitancy or dysuria. Nocturia improved with Flomax 3-5 times a month  MUSCULOSKELETAL: No joint or muscle pain, no back pain, no recent trauma. DERMATOLOGIC: No rash, no itching, no lesions. ENDOCRINE: No polyuria, polydipsia, no heat or cold intolerance. No recent change in weight. HEMATOLOGICAL: No anemia or easy bruising or bleeding. NEUROLOGIC: No headache, seizures, numbness, tingling or weakness. Physical Exam:     Physical Exam:  Visit Vitals  BP (!) 146/87   Pulse 63   Temp 98.2 °F (36.8 °C)   Resp 18   Ht 5' 10\" (1.778 m)   Wt 76.2 kg (168 lb)   SpO2 98%   BMI 24.11 kg/m²      O2 Device: None (Room air)    Temp (24hrs), Av.9 °F (37.2 °C), Min:98 °F (36.7 °C), Max:100.4 °F (38 °C)    No intake/output data recorded. No intake/output data recorded. General:  Alert, cooperative, no distress, appears stated age. Head: Normocephalic, without obvious abnormality, atraumatic. Eyes:  Conjunctivae/corneas clear. PERRL, EOMs intact. Nose: Nares normal. No drainage or sinus tenderness. Neck: Supple, symmetrical, trachea midline, no adenopathy, thyroid: no enlargement, no carotid bruit and no JVD. Lungs:   Clear to auscultation bilaterally. Heart:  Regular rate and rhythm, S1, S2 normal.  Bradycardia     Abdomen: Soft, non-tender. Bowel sounds normal.    Extremities: Extremities normal, atraumatic, no cyanosis or edema. Pulses: 2+ and symmetric all extremities. Skin:  No rashes or lesions multiple keratosis and moles   Neurologic: AAOx3, No focal motor or sensory deficit. Labs Reviewed:    Nonspecific ST changes sinus bradycardia  EKG    Procedures/imaging: see electronic medical records for all procedures/Xrays and details which were not copied into this note but were reviewed prior to creation of Plan      Assessment/Plan     Active Problems:  1. Non-STEMI  He started on aspirin Lipitor and metoprolol as well as a heparin drip  Morphine for pain  Cardiac enzymes will be trended cardiology is consulted through ER  Echocardiogram in the morning and possible cardiac catheterization on Monday    2. BPH   we will continue his Flomax     3. GERD continue   Nexium equivalent in the hospital Protonix    4. History of DVT   on Eliquis this will be held while patient is on heparin drip can be resumed once discharge    5.   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

## 2022-08-07 NOTE — PROGRESS NOTES
Bedside and Verbal shift change report given to Dorian John RN (oncoming nurse) by Maine Robertson RN (offgoing nurse). Report included the following information SBAR, Kardex, MAR, Recent Results and Cardiac Rhythm .

## 2022-08-07 NOTE — ED PROVIDER NOTES
26-year-old male past medical history of DVT on Eliquis, enlarged prostate and is on hormone replacement therapy presents to the emergency department with chest pain for 2 to 3 days. Patient stated his dog of 11 years was recently put down was very emotional.  They did this at home. The patient states the next day he felt dizziness and chest pain anterior radiating to bilateral arms. Patient has no shortness of breath but noticed diaphoresis. No past medical history on file. Past Surgical History:   Procedure Laterality Date    HX BACK SURGERY           No family history on file. Social History     Socioeconomic History    Marital status:      Spouse name: Not on file    Number of children: Not on file    Years of education: Not on file    Highest education level: Not on file   Occupational History    Not on file   Tobacco Use    Smoking status: Never    Smokeless tobacco: Never   Substance and Sexual Activity    Alcohol use: Yes    Drug use: No    Sexual activity: Not on file   Other Topics Concern    Not on file   Social History Narrative    Not on file     Social Determinants of Health     Financial Resource Strain: Not on file   Food Insecurity: Not on file   Transportation Needs: Not on file   Physical Activity: Not on file   Stress: Not on file   Social Connections: Not on file   Intimate Partner Violence: Not on file   Housing Stability: Not on file         ALLERGIES: Patient has no known allergies. Review of Systems    Vitals:    08/06/22 1929   BP: (!) 127/90   Pulse: 89   Resp: 18   Temp: 100.4 °F (38 °C)   SpO2: 98%   Weight: 76.2 kg (168 lb)   Height: 5' 10\" (1.778 m)            Physical Exam     MDM  Number of Diagnoses or Management Options  Diagnosis management comments: Spoke to Dr. Dianne Back who states he can get heparin on Eliquis. He wanted me to stop metoprolol. Observation to the hospitalist.  Will admit to telemetry setting.   Patient is well-appearing will give morphine for pain. EKG normal sinus rhythm at 69 with first-degree AV block. T wave inversions and high lateral leads and V5 V6.        Amount and/or Complexity of Data Reviewed  Clinical lab tests: ordered and reviewed  Tests in the radiology section of CPT®: ordered and reviewed  Tests in the medicine section of CPT®: ordered and reviewed  Decide to obtain previous medical records or to obtain history from someone other than the patient: yes  Obtain history from someone other than the patient: yes  Review and summarize past medical records: yes  Discuss the patient with other providers: yes  Independent visualization of images, tracings, or specimens: yes    Risk of Complications, Morbidity, and/or Mortality  Presenting problems: moderate  Diagnostic procedures: moderate  Management options: moderate           Procedures

## 2022-08-07 NOTE — CONSULTS
Cardiolology  Inpatient Consult      Patient: Nati Collins               Sex: male          DOA: 8/6/2022       YOB: 1950      Age:  74 y.o.        LOS:  LOS: 1 day      Nati Collins is a 74 y.o. male admitted for Chest pain [R07.9]  Recommendations:  Serial markers  Echocardiogram  Probable cardiac cath on Monday  Continue medical therapy    Impression:  Admission for chest pain with elevated troponin  No ST segment elevation on EKG  Possible Takotsubo syndrome  Other problems as enumerated below    Patient Active Problem List    Diagnosis Date Noted    BPH (benign prostatic hyperplasia)     Chest pain 08/06/2022    Esophageal reflux 08/06/2022    Hyperlipidemia 08/06/2022    Impaired fasting glucose 08/06/2022    Actinic keratosis 08/06/2022    Polycythemia 08/06/2022    DVT (deep venous thrombosis) ([facility] 08/06/2022    NSTEMI (non-ST elevated myocardial infarction) ([facility] 08/06/2022      Vitaly Thomson MD  Past Medical History:   Diagnosis Date    BPH (benign prostatic hyperplasia)     DVT (deep venous thrombosis) ([facility]     Hyperlipemia       Past Surgical History:   Procedure Laterality Date    HX BACK SURGERY       No Known Allergies   History reviewed. No pertinent family history.    Current Facility-Administered Medications   Medication Dose Route Frequency    morphine injection 2 mg  2 mg IntraVENous Q4H PRN    metoprolol tartrate (LOPRESSOR) tablet 25 mg  25 mg Oral Q12H    atorvastatin (LIPITOR) tablet 20 mg  20 mg Oral QHS    pantoprazole (PROTONIX) 40 mg in 0.9% sodium chloride 10 mL injection  40 mg IntraVENous DAILY    sodium chloride (NS) flush 5-40 mL  5-40 mL IntraVENous Q8H    sodium chloride (NS) flush 5-40 mL  5-40 mL IntraVENous PRN    magnesium hydroxide (MILK OF MAGNESIA) 400 mg/5 mL oral suspension 30 mL  30 mL Oral DAILY PRN    docusate sodium (COLACE) capsule 100 mg  100 mg Oral BID    aspirin chewable tablet 81 mg  81 mg Oral DAILY    sodium chloride (NS) flush 5-40 mL  5-40 mL IntraVENous Q8H    sodium chloride (NS) flush 5-40 mL  5-40 mL IntraVENous PRN    acetaminophen (TYLENOL) tablet 650 mg  650 mg Oral Q6H PRN    Or    acetaminophen (TYLENOL) suppository 650 mg  650 mg Rectal Q6H PRN    polyethylene glycol (MIRALAX) packet 17 g  17 g Oral DAILY PRN    ondansetron (ZOFRAN ODT) tablet 4 mg  4 mg Oral Q8H PRN    Or    ondansetron (ZOFRAN) injection 4 mg  4 mg IntraVENous Q6H PRN    heparin (porcine) 25,000 units in 0.45% saline 250 ml infusion  12-25 Units/kg/hr IntraVENous TITRATE         Review of Symptoms:    Review of Systems  GENERAL: Patient alert, awake and oriented times 3, able to communicate full sentences and not in distress. HEENT: No change in vision, no earache, tinnitus, sore throat or sinus congestion. NECK: No pain or stiffness. PULMONARY: N+hortness of breath, cough or wheeze. Cardiovascular: no pnd or orthopnea, +CP  GASTROINTESTINAL: No abdominal pain, nausea, vomiting or diarrhea, melena or bright red blood per rectum. GENITOURINARY: +urinary frequency, urgency, hesitancy or dysuria. Nocturia improved with Flomax 3-5 times a month  MUSCULOSKELETAL: No joint or muscle pain, no back pain, no recent trauma. DERMATOLOGIC: No rash, no itching, no lesions. ENDOCRINE: No polyuria, polydipsia, no heat or cold intolerance. No recent change in weight. HEMATOLOGICAL: No anemia or easy bruising or bleeding. NEUROLOGIC: No headache, seizures, numbness, tingling or weakness. Subjective:  Mai Dickson is a 74 y.o.  male who has history of hyperlipidemia, secondary polycythemia, lower extremity DVT, BPH presents to the hospital after having 3 days of progressive anginal equivalent pain. Patient's dog  on Wednesday and on Thursday patient started to develop jaw pain left arm pain and Friday and Saturday had episodes of diaphoresis with chest pain. Patient came to the emergency room after his son who is a EMT urged him.   He did note difficulty and diaphoresis putting up his fence as well as having an episode of diaphoresis today at the [facility]. 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 He denies smoking alcohol or drugs there is family history of cardiovascular disease namely rhythm issues and valvular issues. Patient denies any recent COVID-19 exposures. He is not vaccinated for COVID. Patient has been chest free since he got a dose of morphine. His electrocardiogram showed no evidence of ST segment elevation. His troponin is elevated. .  Cardiac risk factors: Present. Physical Exam    Visit Vitals  BP (!) 99/58   Pulse (!) 59   Temp 97.8 °F (36.6 °C)   Resp 18   Ht 5' 10\" (1.778 m)   Wt 79.8 kg (176 lb)   SpO2 95%   BMI 25.25 kg/m²       General Appearance:  Well developed, well nourished,alert and oriented x 3, and individual in no acute distress. Ears/Nose/Mouth/Throat:   Hearing grossly normal.         Neck: Supple. Chest:   Lungs clear to auscultation bilaterally. Cardiovascular:  Regular rate and rhythm, S1, S2 normal, no murmur. Abdomen:   Soft, non-tender, bowel sounds are active. Extremities: No edema bilaterally. Skin: Warm and dry.      Cardiographics    Telemetry: normal sinus rhythm  ECG: No acute change and unchanged since 2017  Echocardiogram: Pending    Recent radiology, intake/output and wt reviewed    Labs:   Recent Results (from the past 48 hour(s))   EKG, 12 LEAD, INITIAL    Collection Time: 08/06/22  7:39 PM   Result Value Ref Range    Ventricular Rate 69 BPM    Atrial Rate 69 BPM    P-R Interval 246 ms    QRS Duration 86 ms    Q-T Interval 382 ms    QTC Calculation (Bezet) 409 ms    Calculated P Axis 33 degrees    Calculated R Axis 6 degrees    Calculated T Axis 144 degrees    Diagnosis       Sinus rhythm with 1st degree AV block  Left ventricular hypertrophy with repolarization abnormality ( R in aVL ,   Keene product )  Abnormal ECG  When compared with ECG of 03-FEB-2017 11:20,  VT interval has increased  ST now depressed in Lateral leads     CBC WITH AUTOMATED DIFF    Collection Time: 08/06/22  8:00 PM   Result Value Ref Range    WBC 8.4 4.6 - 13.2 K/uL    RBC 5.53 4.35 - 5.65 M/uL    HGB 17.1 (H) 13.0 - 16.0 g/dL    HCT 50.4 (H) 36.0 - 48.0 %    MCV 91.1 78.0 - 100.0 FL    MCH 30.9 24.0 - 34.0 PG    MCHC 33.9 31.0 - 37.0 g/dL    RDW 13.1 11.6 - 14.5 %    PLATELET 881 921 - 777 K/uL    MPV 9.5 9.2 - 11.8 FL    NRBC 0.0 0  WBC    ABSOLUTE NRBC 0.00 0.00 - 0.01 K/uL    NEUTROPHILS 67 40 - 73 %    LYMPHOCYTES 21 21 - 52 %    MONOCYTES 10 3 - 10 %    EOSINOPHILS 1 0 - 5 %    BASOPHILS 1 0 - 2 %    IMMATURE GRANULOCYTES 0 0.0 - 0.5 %    ABS. NEUTROPHILS 5.6 1.8 - 8.0 K/UL    ABS. LYMPHOCYTES 1.8 0.9 - 3.6 K/UL    ABS. MONOCYTES 0.8 0.05 - 1.2 K/UL    ABS. EOSINOPHILS 0.1 0.0 - 0.4 K/UL    ABS. BASOPHILS 0.1 0.0 - 0.1 K/UL    ABS. IMM. GRANS. 0.0 0.00 - 0.04 K/UL    DF AUTOMATED     METABOLIC PANEL, COMPREHENSIVE    Collection Time: 08/06/22  8:00 PM   Result Value Ref Range    Sodium 136 136 - 145 mmol/L    Potassium 3.9 3.5 - 5.5 mmol/L    Chloride 102 100 - 111 mmol/L    CO2 27 21 - 32 mmol/L    Anion gap 7 3.0 - 18 mmol/L    Glucose 107 (H) 74 - 99 mg/dL    BUN 21 (H) 7.0 - 18 MG/DL    Creatinine 1.27 0.6 - 1.3 MG/DL    BUN/Creatinine ratio 17 12 - 20      GFR est AA >60 >60 ml/min/1.73m2    GFR est non-AA 55 (L) >60 ml/min/1.73m2    Calcium 9.1 8.5 - 10.1 MG/DL    Bilirubin, total 0.5 0.2 - 1.0 MG/DL    ALT (SGPT) 43 16 - 61 U/L    AST (SGOT) 70 (H) 10 - 38 U/L    Alk.  phosphatase 38 (L) 45 - 117 U/L    Protein, total 7.6 6.4 - 8.2 g/dL    Albumin 3.9 3.4 - 5.0 g/dL    Globulin 3.7 2.0 - 4.0 g/dL    A-G Ratio 1.1 0.8 - 1.7     TROPONIN-HIGH SENSITIVITY    Collection Time: 08/06/22  8:00 PM   Result Value Ref Range    Troponin-High Sensitivity 3,355 (HH) 0 - 78 ng/L   PTT    Collection Time: 08/06/22  8:00 PM   Result Value Ref Range    aPTT 27.2 23.0 - 36.4 SEC   D DIMER    Collection Time: 08/06/22  8:00 PM   Result Value Ref Range    D DIMER <0.27 <0.46 ug/ml(FEU)   PROTHROMBIN TIME + INR    Collection Time: 08/06/22  8:00 PM   Result Value Ref Range    Prothrombin time 13.6 11.5 - 15.2 sec    INR 1.0 0.8 - 1.2     TSH 3RD GENERATION    Collection Time: 08/06/22  8:00 PM   Result Value Ref Range    TSH 0.84 0.36 - 3.74 uIU/mL   COVID-19 RAPID TEST    Collection Time: 08/06/22  9:30 PM   Result Value Ref Range    Specimen source Nasopharyngeal      COVID-19 rapid test Not detected NOTD     PTT    Collection Time: 08/07/22  3:16 AM   Result Value Ref Range    aPTT 144.3 (H) 23.0 - 36.4 SEC   TROPONIN-HIGH SENSITIVITY    Collection Time: 08/07/22  3:16 AM   Result Value Ref Range    Troponin-High Sensitivity 8,749 (HH) 0 - 78 ng/L   CBC WITH AUTOMATED DIFF    Collection Time: 08/07/22  3:16 AM   Result Value Ref Range    WBC 9.3 4.6 - 13.2 K/uL    RBC 5.25 4.35 - 5.65 M/uL    HGB 16.1 (H) 13.0 - 16.0 g/dL    HCT 47.6 36.0 - 48.0 %    MCV 90.7 78.0 - 100.0 FL    MCH 30.7 24.0 - 34.0 PG    MCHC 33.8 31.0 - 37.0 g/dL    RDW 13.2 11.6 - 14.5 %    PLATELET 588 018 - 345 K/uL    MPV 10.6 9.2 - 11.8 FL    NRBC 0.0 0  WBC    ABSOLUTE NRBC 0.00 0.00 - 0.01 K/uL    NEUTROPHILS 59 40 - 73 %    LYMPHOCYTES 29 21 - 52 %    MONOCYTES 9 3 - 10 %    EOSINOPHILS 2 0 - 5 %    BASOPHILS 1 0 - 2 %    IMMATURE GRANULOCYTES 0 0.0 - 0.5 %    ABS. NEUTROPHILS 5.5 1.8 - 8.0 K/UL    ABS. LYMPHOCYTES 2.7 0.9 - 3.6 K/UL    ABS. MONOCYTES 0.8 0.05 - 1.2 K/UL    ABS. EOSINOPHILS 0.2 0.0 - 0.4 K/UL    ABS. BASOPHILS 0.1 0.0 - 0.1 K/UL    ABS. IMM.  GRANS. 0.0 0.00 - 0.04 K/UL    DF AUTOMATED     METABOLIC PANEL, COMPREHENSIVE    Collection Time: 08/07/22  3:16 AM   Result Value Ref Range    Sodium 138 136 - 145 mmol/L    Potassium 4.0 3.5 - 5.5 mmol/L    Chloride 103 100 - 111 mmol/L    CO2 28 21 - 32 mmol/L    Anion gap 7 3.0 - 18 mmol/L    Glucose 88 74 - 99 mg/dL    BUN 21 (H) 7.0 - 18 MG/DL    Creatinine 1.12 0.6 - 1.3 MG/DL    BUN/Creatinine ratio 19 12 - 20      GFR est AA >60 >60 ml/min/1.73m2    GFR est non-AA >60 >60 ml/min/1.73m2    Calcium 8.7 8.5 - 10.1 MG/DL    Bilirubin, total 0.6 0.2 - 1.0 MG/DL    ALT (SGPT) 40 16 - 61 U/L    AST (SGOT) 90 (H) 10 - 38 U/L    Alk.  phosphatase 30 (L) 45 - 117 U/L    Protein, total 6.7 6.4 - 8.2 g/dL    Albumin 3.4 3.4 - 5.0 g/dL    Globulin 3.3 2.0 - 4.0 g/dL    A-G Ratio 1.0 0.8 - 1.7     MAGNESIUM    Collection Time: 08/07/22  3:16 AM   Result Value Ref Range    Magnesium 2.2 1.6 - 2.6 mg/dL   LIPID PANEL    Collection Time: 08/07/22  3:16 AM   Result Value Ref Range    LIPID PROFILE          Cholesterol, total 239 (H) <200 MG/DL    Triglyceride 56 <150 MG/DL    HDL Cholesterol 45 40 - 60 MG/DL    LDL, calculated 182.8 (H) 0 - 100 MG/DL    VLDL, calculated 11.2 MG/DL    CHOL/HDL Ratio 5.3 (H) 0 - 5.0     PTT    Collection Time: 08/07/22  7:51 AM   Result Value Ref Range    aPTT 95.8 (H) 23.0 - 36.4 SEC   ECHO ADULT COMPLETE    Collection Time: 08/07/22  8:56 AM   Result Value Ref Range    IVSd 0.9 0.6 - 1.0 cm    LVIDd 4.8 4.2 - 5.9 cm    LVIDs 3.3 cm    LVOT Diameter 2.2 cm    LVPWd 1.2 (A) 0.6 - 1.0 cm    EF BP 42 (A) 55 - 100 %    LV Ejection Fraction A2C 41 %    LV Ejection Fraction A4C 44 %    LV EDV A2C 87 mL    LV EDV A4C 81 mL    LV EDV BP 84 67 - 155 mL    LV ESV A2C 52 mL    LV ESV A4C 46 mL    LV ESV BP 49 22 - 58 mL    LVOT Peak Gradient 3 mmHg    LVOT Mean Gradient 2 mmHg    LVOT SV 62.3 ml    LVOT Peak Velocity 0.9 m/s    LVOT VTI 16.4 cm    RVIDd 2.9 cm    RV Free Wall Peak S' 10 cm/s    LA Diameter 3.8 cm    LA Volume A/L 50 mL    LA Volume 2C 44 18 - 58 mL    LA Volume 4C 46 18 - 58 mL    LA Volume BP 48 18 - 58 mL    AV Area by Peak Velocity 3.9 cm2    AV Area by VTI 3.7 cm2    AV Peak Gradient 3 mmHg    AV Mean Gradient 2 mmHg    AV Peak Velocity 0.9 m/s    AV Mean Velocity 0.7 m/s    AV VTI 17.1 cm    MV A Velocity 0.42 m/s    MV E Wave Deceleration Time 168.9 ms    MV E Velocity 0.57 m/s    LV E' Lateral Velocity 8 cm/s    LV E' Septal Velocity 6 cm/s    TAPSE 1.2 (A) 1.7 cm    Ascending Aorta 4.3 cm    Aortic Root 3.9 cm    Fractional Shortening 2D 31 28 - 44 %    LV ESV Index BP 25 mL/m2    LV EDV Index BP 42 mL/m2    LV ESV Index A4C 23 mL/m2    LV EDV Index A4C 41 mL/m2    LV ESV Index A2C 26 mL/m2    LV EDV Index A2C 44 mL/m2    LVIDd Index 2.42 cm/m2    LVIDs Index 1.67 cm/m2    LV RWT Ratio 0.50     LV Mass 2D 181.9 88 - 224 g    LV Mass 2D Index 91.9 49 - 115 g/m2    MV E/A 1.36     E/E' Ratio (Averaged) 8.31     E/E' Lateral 7.13     E/E' Septal 9.50     LA Volume Index BP 24 16 - 34 ml/m2    LA Volume Index A/L 25 16 - 34 mL/m2    LVOT Stroke Volume Index 31.5 mL/m2    LVOT Area 3.8 cm2    LA Volume Index 2C 22 16 - 34 mL/m2    LA Volume Index 4C 23 16 - 34 mL/m2    LA Size Index 1.92 cm/m2    LA/AO Root Ratio 0.97     Ao Root Index 1.97 cm/m2    Ascending Aorta Index 2.17 cm/m2    AV Velocity Ratio 1.00     LVOT:AV VTI Index 0.96     JENAE/BSA VTI 1.9 cm2/m2    JENAE/BSA Peak Velocity 2.0 cm2/m2   TROPONIN-HIGH SENSITIVITY    Collection Time: 08/07/22 12:32 PM   Result Value Ref Range    Troponin-High Sensitivity 11,247 (HH) 0 - 78 ng/L           Carina Garner MD

## 2022-08-07 NOTE — ED NOTES
TRANSFER - OUT REPORT:    Verbal report given to Ruben Crawford (name) on Phong Apple  being transferred to tele (unit) for routine progression of care       Report consisted of patients Situation, Background, Assessment and   Recommendations(SBAR). Information from the following report(s) SBAR, ED Summary, Intake/Output, MAR, Recent Results and Cardiac Rhythm SR 1st AV block was reviewed with the receiving nurse. Lines:   Peripheral IV 08/06/22 Right Antecubital (Active)   Site Assessment Clean, dry, & intact 08/06/22 2011   Dressing Status Clean, dry, & intact 08/06/22 2011        Opportunity for questions and clarification was provided.       Patient transported with:   Monitor  Registered Nurse

## 2022-08-07 NOTE — PROGRESS NOTES
Paged per RN for trop, recommend to inform cardiologist-and decide if pt needs to cath today.  Rn reported no chest pain     Updated Dr. Madeleine Lee

## 2022-08-07 NOTE — PROGRESS NOTES
Problem: Patient Education: Go to Patient Education Activity  Goal: Patient/Family Education  Outcome: Progressing Towards Goal     Problem: Unstable angina/NSTEMI: Day of Admission/Day 1  Goal: Off Pathway (Use only if patient is Off Pathway)  Outcome: Progressing Towards Goal  Goal: Activity/Safety  Outcome: Progressing Towards Goal  Goal: Consults, if ordered  Outcome: Progressing Towards Goal  Goal: Diagnostic Test/Procedures  Outcome: Progressing Towards Goal  Goal: Nutrition/Diet  Outcome: Progressing Towards Goal  Goal: Discharge Planning  Outcome: Progressing Towards Goal  Goal: Medications  Outcome: Progressing Towards Goal  Goal: Respiratory  Outcome: Progressing Towards Goal  Goal: Treatments/Interventions/Procedures  Outcome: Progressing Towards Goal  Goal: Psychosocial  Outcome: Progressing Towards Goal  Goal: *Hemodynamically stable  Outcome: Progressing Towards Goal  Goal: *Optimal pain control at patient's stated goal  Outcome: Progressing Towards Goal  Goal: *Lungs clear or at baseline  Outcome: Progressing Towards Goal

## 2022-08-07 NOTE — PROGRESS NOTES
Hospitalist Progress Note-critical care note     Patient: Walter Olvera MRN: [id number]  CSN: [id number]    YOB: 1950  Age: 74 y.o. Sex: male    DOA: 8/6/2022 LOS:  LOS: 1 day            Chief complaint: nstemi, polycythemia     Assessment/Plan         Hospital Problems  Date Reviewed: 8/6/2022            Codes Class Noted POA    BPH (benign prostatic hyperplasia) ICD-10-CM: N40.0  ICD-9-CM: 600.00  Unknown Unknown        Chest pain ICD-10-CM: R07.9  ICD-9-CM: 786.50  8/6/2022 Unknown        Esophageal reflux ICD-10-CM: K21.9  ICD-9-CM: 530.81  8/6/2022 Yes        Polycythemia ICD-10-CM: D75.1  ICD-9-CM: 238.4  8/6/2022 Yes        DVT (deep venous thrombosis) ([facility] ICD-10-CM: I82.409  ICD-9-CM: 453.40  8/6/2022 Unknown        * (Principal) NSTEMI (non-ST elevated myocardial infarction) [facility]) ICD-10-CM: I21.4  ICD-9-CM: 410.70  8/6/2022 Unknown            Nstemi    No chest pain now , ce trending up, will check another CE   Continue  aspirin Lipitor and metoprolol and heparin   Morphine for pain,   Ekg st seg change in lateral lead   Cardiac enzymes will be trended cardiology is consulted through ER  Echocardiogram in the morning and possible cardiac catheterization on Monday     BPH   continue his Flomax     GERD continue   Protonix     History of DVT  on Eliquis this will be held while patient is on heparin drip can be resumed once discharge      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 Michelle Chandler     Continue heparin gtt . Cardiologist consulted      Disposition :tbd,   Review of systems:    General: No fevers or chills. Cardiovascular: No chest pain or pressure. No palpitations. Pulmonary: No shortness of breath. Gastrointestinal: No nausea, vomiting.      Vital signs/Intake and Output:  Visit Vitals  /74   Pulse 67   Temp 98.4 °F (36.9 °C)   Resp 18   Ht 5' 10\" (1.778 m)   Wt 79.8 kg (176 lb)   SpO2 97%   BMI 25.25 kg/m²     Current Shift:  08/07 0701 - 08/07 1900  In: 240 [P.O.:240]  Out: -   Last three shifts:  08/05 1901 - 08/07 0700  In: 240 [P.O.:240]  Out: 250 [Urine:250]    Physical Exam:  General: WD, WN. Alert, cooperative, no acute distress    HEENT: NC, Atraumatic. PERRLA, anicteric sclerae. Lungs: CTA Bilaterally. No Wheezing/Rhonchi/Rales. Heart:  Regular  rhythm,  No murmur, No Rubs, No Gallops  Abdomen: Soft, Non distended, Non tender. +Bowel sounds,   Extremities: No c/c/e  Psych:   Not anxious or agitated. Neurologic:  No acute neurological deficit. Labs: Results:       Chemistry Recent Labs     08/07/22 0316 08/06/22 2000   GLU 88 107*    136   K 4.0 3.9    102   CO2 28 27   BUN 21* 21*   CREA 1.12 1.27   CA 8.7 9.1   AGAP 7 7   BUCR 19 17   AP 30* 38*   TP 6.7 7.6   ALB 3.4 3.9   GLOB 3.3 3.7   AGRAT 1.0 1.1      CBC w/Diff Recent Labs     08/07/22 0316 08/06/22 2000   WBC 9.3 8.4   RBC 5.25 5.53   HGB 16.1* 17.1*   HCT 47.6 50.4*    162   GRANS 59 67   LYMPH 29 21   EOS 2 1      Cardiac Enzymes No results for input(s): CPK, CKND1, KOFI in the last 72 hours. No lab exists for component: CKRMB, TROIP   Coagulation Recent Labs     08/07/22 0751 08/07/22 0316 08/06/22 2000   PTP  --   --  13.6   INR  --   --  1.0   APTT 95.8* 144.3* 27.2       Lipid Panel No results found for: CHOL, CHOLPOCT, CHOLX, CHLST, CHOLV, 974831, HDL, HDLP, LDL, LDLC, DLDLP, 247986, VLDLC, VLDL, TGLX, TRIGL, TRIGP, TGLPOCT, CHHD, CHHDX   BNP No results for input(s): BNPP in the last 72 hours.    Liver Enzymes Recent Labs     08/07/22  0316   TP 6.7   ALB 3.4   AP 30*      Thyroid Studies Lab Results   Component Value Date/Time    TSH 0.84 08/06/2022 08:00 PM        Procedures/imaging: see electronic medical records for all procedures/Xrays and details which were not copied into this note but were reviewed prior to creation of Plan    XR CHEST PORT    Result Date: 8/6/2022  EXAM: One view chest x-ray CLINICAL INDICATION/HISTORY: Chest pain. COMPARISON: 04/29/2009. TECHNIQUE: Single AP view of the chest was obtained. _______________ FINDINGS: HEART, VESSELS, MEDIASTINUM: Heart size is normal. No vascular congestion. LUNGS, PLEURAL SPACES: The lungs are clear. No effusion or pneumothorax. BONY THORAX, SOFT TISSUES: Unremarkable. _______________     No acute cardiopulmonary process.       Vijaya Lee MD

## 2022-08-08 LAB
ALBUMIN SERPL-MCNC: 3.4 G/DL (ref 3.4–5)
ALBUMIN/GLOB SERPL: 1.1 {RATIO} (ref 0.8–1.7)
ALP SERPL-CCNC: 32 U/L (ref 45–117)
ALT SERPL-CCNC: 40 U/L (ref 16–61)
ANION GAP SERPL CALC-SCNC: 6 MMOL/L (ref 3–18)
APTT PPP: 112.3 SEC (ref 23–36.4)
APTT PPP: 98.3 SEC (ref 23–36.4)
AST SERPL-CCNC: 65 U/L (ref 10–38)
ATRIAL RATE: 59 BPM
BASOPHILS # BLD: 0 K/UL (ref 0–0.1)
BASOPHILS NFR BLD: 1 % (ref 0–2)
BILIRUB SERPL-MCNC: 0.8 MG/DL (ref 0.2–1)
BUN SERPL-MCNC: 18 MG/DL (ref 7–18)
BUN/CREAT SERPL: 15 (ref 12–20)
CALCIUM SERPL-MCNC: 8.9 MG/DL (ref 8.5–10.1)
CALCULATED P AXIS, ECG09: 50 DEGREES
CALCULATED R AXIS, ECG10: 19 DEGREES
CALCULATED T AXIS, ECG11: 169 DEGREES
CHLORIDE SERPL-SCNC: 105 MMOL/L (ref 100–111)
CO2 SERPL-SCNC: 27 MMOL/L (ref 21–32)
CREAT SERPL-MCNC: 1.22 MG/DL (ref 0.6–1.3)
DIAGNOSIS, 93000: NORMAL
DIFFERENTIAL METHOD BLD: ABNORMAL
EOSINOPHIL # BLD: 0.2 K/UL (ref 0–0.4)
EOSINOPHIL NFR BLD: 3 % (ref 0–5)
ERYTHROCYTE [DISTWIDTH] IN BLOOD BY AUTOMATED COUNT: 13.2 % (ref 11.6–14.5)
EST. AVERAGE GLUCOSE BLD GHB EST-MCNC: 120 MG/DL
GLOBULIN SER CALC-MCNC: 3.2 G/DL (ref 2–4)
GLUCOSE BLD STRIP.AUTO-MCNC: 107 MG/DL (ref 70–110)
GLUCOSE SERPL-MCNC: 92 MG/DL (ref 74–99)
HBA1C MFR BLD: 5.8 % (ref 4.2–5.6)
HCT VFR BLD AUTO: 48.5 % (ref 36–48)
HGB BLD-MCNC: 16.3 G/DL (ref 13–16)
IMM GRANULOCYTES # BLD AUTO: 0 K/UL (ref 0–0.04)
IMM GRANULOCYTES NFR BLD AUTO: 1 % (ref 0–0.5)
INR PPP: 1 (ref 0.8–1.2)
LYMPHOCYTES # BLD: 2.1 K/UL (ref 0.9–3.6)
LYMPHOCYTES NFR BLD: 29 % (ref 21–52)
MAGNESIUM SERPL-MCNC: 2.4 MG/DL (ref 1.6–2.6)
MCH RBC QN AUTO: 30.6 PG (ref 24–34)
MCHC RBC AUTO-ENTMCNC: 33.6 G/DL (ref 31–37)
MCV RBC AUTO: 91 FL (ref 78–100)
MONOCYTES # BLD: 0.8 K/UL (ref 0.05–1.2)
MONOCYTES NFR BLD: 11 % (ref 3–10)
NEUTS SEG # BLD: 4.1 K/UL (ref 1.8–8)
NEUTS SEG NFR BLD: 56 % (ref 40–73)
NRBC # BLD: 0 K/UL (ref 0–0.01)
NRBC BLD-RTO: 0 PER 100 WBC
P-R INTERVAL, ECG05: 236 MS
PLATELET # BLD AUTO: 140 K/UL (ref 135–420)
PMV BLD AUTO: 9.5 FL (ref 9.2–11.8)
POTASSIUM SERPL-SCNC: 4.3 MMOL/L (ref 3.5–5.5)
PROT SERPL-MCNC: 6.6 G/DL (ref 6.4–8.2)
PROTHROMBIN TIME: 14.1 SEC (ref 11.5–15.2)
Q-T INTERVAL, ECG07: 436 MS
QRS DURATION, ECG06: 84 MS
QTC CALCULATION (BEZET), ECG08: 431 MS
RBC # BLD AUTO: 5.33 M/UL (ref 4.35–5.65)
SODIUM SERPL-SCNC: 138 MMOL/L (ref 136–145)
TROPONIN-HIGH SENSITIVITY: 5993 NG/L (ref 0–78)
TROPONIN-HIGH SENSITIVITY: 6976 NG/L (ref 0–78)
VENTRICULAR RATE, ECG03: 59 BPM
WBC # BLD AUTO: 7.2 K/UL (ref 4.6–13.2)

## 2022-08-08 PROCEDURE — 36415 COLL VENOUS BLD VENIPUNCTURE: CPT

## 2022-08-08 PROCEDURE — 83036 HEMOGLOBIN GLYCOSYLATED A1C: CPT

## 2022-08-08 PROCEDURE — 84484 ASSAY OF TROPONIN QUANT: CPT

## 2022-08-08 PROCEDURE — 93005 ELECTROCARDIOGRAM TRACING: CPT

## 2022-08-08 PROCEDURE — 85730 THROMBOPLASTIN TIME PARTIAL: CPT

## 2022-08-08 PROCEDURE — 65270000046 HC RM TELEMETRY

## 2022-08-08 PROCEDURE — C9113 INJ PANTOPRAZOLE SODIUM, VIA: HCPCS | Performed by: INTERNAL MEDICINE

## 2022-08-08 PROCEDURE — 85025 COMPLETE CBC W/AUTO DIFF WBC: CPT

## 2022-08-08 PROCEDURE — 85610 PROTHROMBIN TIME: CPT

## 2022-08-08 PROCEDURE — 74011250636 HC RX REV CODE- 250/636: Performed by: INTERNAL MEDICINE

## 2022-08-08 PROCEDURE — 80053 COMPREHEN METABOLIC PANEL: CPT

## 2022-08-08 PROCEDURE — 74011250637 HC RX REV CODE- 250/637: Performed by: INTERNAL MEDICINE

## 2022-08-08 PROCEDURE — 83735 ASSAY OF MAGNESIUM: CPT

## 2022-08-08 PROCEDURE — 82962 GLUCOSE BLOOD TEST: CPT

## 2022-08-08 PROCEDURE — 74011000250 HC RX REV CODE- 250: Performed by: INTERNAL MEDICINE

## 2022-08-08 RX ORDER — METRONIDAZOLE 7.5 MG/G
CREAM TOPICAL 2 TIMES DAILY
Status: ON HOLD | COMMUNITY
End: 2022-08-08 | Stop reason: CLARIF

## 2022-08-08 RX ORDER — MAGNESIUM SULFATE 100 %
4 CRYSTALS MISCELLANEOUS AS NEEDED
Status: DISCONTINUED | OUTPATIENT
Start: 2022-08-08 | End: 2022-08-11 | Stop reason: HOSPADM

## 2022-08-08 RX ORDER — MELATONIN
1000 DAILY
Status: DISCONTINUED | OUTPATIENT
Start: 2022-08-09 | End: 2022-08-11 | Stop reason: HOSPADM

## 2022-08-08 RX ORDER — GLIMEPIRIDE 4 MG/1
4 TABLET ORAL 2 TIMES DAILY
Status: ON HOLD | COMMUNITY
End: 2022-08-08 | Stop reason: CLARIF

## 2022-08-08 RX ORDER — METOPROLOL TARTRATE 25 MG/1
12.5 TABLET, FILM COATED ORAL EVERY 12 HOURS
Status: DISCONTINUED | OUTPATIENT
Start: 2022-08-08 | End: 2022-08-11 | Stop reason: HOSPADM

## 2022-08-08 RX ORDER — ATORVASTATIN CALCIUM 20 MG/1
40 TABLET, FILM COATED ORAL
Status: DISCONTINUED | OUTPATIENT
Start: 2022-08-08 | End: 2022-08-11 | Stop reason: HOSPADM

## 2022-08-08 RX ORDER — METFORMIN HYDROCHLORIDE 500 MG/1
500 TABLET, EXTENDED RELEASE ORAL 2 TIMES DAILY
Status: ON HOLD | COMMUNITY
End: 2022-08-08 | Stop reason: CLARIF

## 2022-08-08 RX ORDER — PANTOPRAZOLE SODIUM 40 MG/1
40 TABLET, DELAYED RELEASE ORAL DAILY
Status: ON HOLD | COMMUNITY
End: 2022-08-09

## 2022-08-08 RX ORDER — DULAGLUTIDE 0.75 MG/.5ML
0.75 INJECTION, SOLUTION SUBCUTANEOUS
Status: ON HOLD | COMMUNITY
End: 2022-08-08 | Stop reason: CLARIF

## 2022-08-08 RX ORDER — MECLIZINE HYDROCHLORIDE 25 MG/1
TABLET ORAL
Status: ON HOLD | COMMUNITY
End: 2022-08-08 | Stop reason: CLARIF

## 2022-08-08 RX ORDER — METOPROLOL TARTRATE 25 MG/1
25 TABLET, FILM COATED ORAL 2 TIMES DAILY
COMMUNITY
End: 2022-08-11

## 2022-08-08 RX ORDER — LANOLIN ALCOHOL/MO/W.PET/CERES
500 CREAM (GRAM) TOPICAL DAILY
Status: DISCONTINUED | OUTPATIENT
Start: 2022-08-09 | End: 2022-08-11 | Stop reason: HOSPADM

## 2022-08-08 RX ORDER — RANOLAZINE 500 MG/1
TABLET, EXTENDED RELEASE ORAL 2 TIMES DAILY
Status: ON HOLD | COMMUNITY
End: 2022-08-08 | Stop reason: CLARIF

## 2022-08-08 RX ORDER — GLUCOSAMINE/CHONDR SU A SOD 167-133 MG
CAPSULE ORAL
Status: ON HOLD | COMMUNITY
End: 2022-08-08 | Stop reason: CLARIF

## 2022-08-08 RX ORDER — ASPIRIN 325 MG
325 TABLET, DELAYED RELEASE (ENTERIC COATED) ORAL
COMMUNITY
End: 2022-08-11

## 2022-08-08 RX ORDER — INSULIN LISPRO 100 [IU]/ML
INJECTION, SOLUTION INTRAVENOUS; SUBCUTANEOUS
Status: DISCONTINUED | OUTPATIENT
Start: 2022-08-08 | End: 2022-08-11

## 2022-08-08 RX ORDER — LANOLIN ALCOHOL/MO/W.PET/CERES
325 CREAM (GRAM) TOPICAL
Status: ON HOLD | COMMUNITY
End: 2022-08-08 | Stop reason: CLARIF

## 2022-08-08 RX ORDER — IRBESARTAN 150 MG/1
150 TABLET ORAL
Status: ON HOLD | COMMUNITY
End: 2022-08-08 | Stop reason: CLARIF

## 2022-08-08 RX ORDER — CHOLECALCIFEROL (VITAMIN D3) 125 MCG
1000 CAPSULE ORAL DAILY
COMMUNITY

## 2022-08-08 RX ORDER — MAGNESIUM 200 MG
200 TABLET ORAL 2 TIMES DAILY
Status: ON HOLD | COMMUNITY
End: 2022-08-08 | Stop reason: CLARIF

## 2022-08-08 RX ORDER — DEXTROSE MONOHYDRATE 100 MG/ML
0-250 INJECTION, SOLUTION INTRAVENOUS AS NEEDED
Status: DISCONTINUED | OUTPATIENT
Start: 2022-08-08 | End: 2022-08-11 | Stop reason: HOSPADM

## 2022-08-08 RX ORDER — CHOLECALCIFEROL (VITAMIN D3) 125 MCG
100 CAPSULE ORAL DAILY
Status: ON HOLD | COMMUNITY
End: 2022-08-08 | Stop reason: CLARIF

## 2022-08-08 RX ORDER — LANOLIN ALCOHOL/MO/W.PET/CERES
500 CREAM (GRAM) TOPICAL DAILY
COMMUNITY

## 2022-08-08 RX ORDER — KETOCONAZOLE 20 MG/ML
SHAMPOO TOPICAL DAILY PRN
Status: ON HOLD | COMMUNITY
End: 2022-08-08 | Stop reason: CLARIF

## 2022-08-08 RX ADMIN — SODIUM CHLORIDE, PRESERVATIVE FREE 10 ML: 5 INJECTION INTRAVENOUS at 22:00

## 2022-08-08 RX ADMIN — SODIUM CHLORIDE, PRESERVATIVE FREE 10 ML: 5 INJECTION INTRAVENOUS at 06:43

## 2022-08-08 RX ADMIN — SODIUM CHLORIDE, PRESERVATIVE FREE 10 ML: 5 INJECTION INTRAVENOUS at 14:34

## 2022-08-08 RX ADMIN — ASPIRIN 81 MG: 81 TABLET, CHEWABLE ORAL at 09:57

## 2022-08-08 RX ADMIN — METOPROLOL TARTRATE 25 MG: 25 TABLET, FILM COATED ORAL at 09:57

## 2022-08-08 RX ADMIN — HEPARIN SODIUM 17 UNITS/KG/HR: 10000 INJECTION, SOLUTION INTRAVENOUS at 14:36

## 2022-08-08 RX ADMIN — METOPROLOL TARTRATE 12.5 MG: 25 TABLET, FILM COATED ORAL at 22:00

## 2022-08-08 RX ADMIN — SODIUM CHLORIDE 40 MG: 9 INJECTION INTRAMUSCULAR; INTRAVENOUS; SUBCUTANEOUS at 09:57

## 2022-08-08 RX ADMIN — DOCUSATE SODIUM 100 MG: 100 CAPSULE ORAL at 22:00

## 2022-08-08 NOTE — PROGRESS NOTES
D/C Plan: HOme with family assistance when medically appropriate    CM notes patient may have cardiac cath today. CM anticipates patient may discharge within the next 48 hours. Patient is independent at home and wife can drive home. CM to watch note to assist with identifying any discharge planning needs. Care Management Interventions  PCP Verified by CM:  Yes  Transition of Care Consult (CM Consult): Discharge Planning  Support Systems: Spouse/Significant Other, Child(jeanie)  Confirm Follow Up Transport: Family  The Plan for Transition of Care is Related to the Following Treatment Goals : NSTEMI  The Patient and/or Patient Representative was Provided with a Choice of Provider and Agrees with the Discharge Plan?: Yes  Name of the Patient Representative Who was Provided with a Choice of Provider and Agrees with the Discharge Plan: Catrina Sow, patient  Discharge Location  Patient Expects to be Discharged to[de-identified] Home with family assistance

## 2022-08-08 NOTE — PROGRESS NOTES
Cardiology Progress Note        Patient: Phong Apple        Sex: male          DOA: 8/6/2022  YOB: 1950      Age:  74 y.o.        LOS:  LOS: 2 days       Patient seen examined, chart reviewed    Assessment/Plan     Patient Active Problem List   Diagnosis Code    Chest pain R07.9    Esophageal reflux K21.9    Hyperlipidemia E78.5    Impaired fasting glucose R73.01    Actinic keratosis L57.0    Polycythemia D75.1    DVT (deep venous thrombosis) (formerly [facility]) I82.409    NSTEMI (non-ST elevated myocardial infarction) ([facility] [address] I21.4    BPH (benign prostatic hyperplasia) N40.0       I reviewed echocardiogram personally   Echocardiogram revealed inferior, basal and mid inferolateral hypokinesis with estimated LVEF 40-45%    Plan:     Continue aspirin and IV heparin. Change metoprolol tartrate to 12.5 mg by mouth twice a day. Increase atorvastatin to 40 mg by mouth at bedside. Patient had last dose of Eliquis on Saturday evening   In view of non-STEMI advised about left heart catheterization, coronary angiogram plus or minus PCI. All risks, benefits and alternatives explained to patient and he verbally understood. Discussed with patient about risk of cardiac catheterization including not limited are hematoma, retroperitoneal bleed, pseudoaneurysm, AV fistula, dissection, thrombosis and embolism, radial artery occlusion, myocardial infarction, CVA, TIA, dissection and perforation of great vessels, cardiac perforation, tamponade, atheroembolism, allergy reaction, infection, acute renal failure, arrhythmias, radiation injury, congestive heart failure, respiratory failure, multiorgan failure, death. Patient agreed for procedure.     NPO after midnight   Plan discussed with patient and family member              Subjective:    cc:   Complaining of chest pain since Thursday, midsternal tightness/pressure off and on, no more chest pain after he came to the hospital, denies any similar episode in the past.  Last dose of Eliquis on Saturday late evening      REVIEW OF SYSTEMS:     General: No fevers or chills. Cardiovascular:  positive chest pain,No palpitations, No orthopnea, No PND, No leg swelling, No claudication  Pulmonary: No  dyspnea. Gastrointestinal: No nausea, vomiting, bleeding  Neurology: No Dizziness    Objective:      Visit Vitals  BP 93/64   Pulse 69   Temp 98.1 °F (36.7 °C)   Resp 18   Ht 5' 10\" (1.778 m)   Wt 83.6 kg (184 lb 4.8 oz)   SpO2 94%   BMI 26.44 kg/m²     Body mass index is 26.44 kg/m². Physical Exam:  General Appearance: Comfortable, not using accessory muscles of respiration. HEENT: MATTY. HEAD: Atraumatic  NECK: No JVD, no thyroidomeglay. CAROTIDS: no bruit  LUNGS: Clear bilaterally. HEART: S1+S2 audible, no murmur, no pericardial rub. ABD: Non-tender, BS Audible    EXT: No edema, and no cyanosis. VASCULAR EXAM: Pulses are intact. PSYCHIATRIC EXAM: Mood is appropriate. MUSCULOSKELETAL: Grossly no joint deformity.   NEUROLOGICAL: AAO times 3, No motor and sensory deficit    Medication:  Current Facility-Administered Medications   Medication Dose Route Frequency    [START ON 8/9/2022] cholecalciferol (VITAMIN D3) (1000 Units /25 mcg) tablet 1,000 Units  1,000 Units Oral DAILY    [START ON 8/9/2022] cyanocobalamin (VITAMIN B12) tablet 500 mcg  500 mcg Oral DAILY    insulin lispro (HUMALOG) injection   SubCUTAneous AC&HS    glucose chewable tablet 16 g  4 Tablet Oral PRN    glucagon (GLUCAGEN) injection 1 mg  1 mg IntraMUSCular PRN    dextrose 10% infusion 0-250 mL  0-250 mL IntraVENous PRN    atorvastatin (LIPITOR) tablet 40 mg  40 mg Oral QHS    metoprolol tartrate (LOPRESSOR) tablet 12.5 mg  12.5 mg Oral Q12H    morphine injection 2 mg  2 mg IntraVENous Q4H PRN    pantoprazole (PROTONIX) 40 mg in 0.9% sodium chloride 10 mL injection  40 mg IntraVENous DAILY    sodium chloride (NS) flush 5-40 mL  5-40 mL IntraVENous Q8H    sodium chloride (NS) flush 5-40 mL  5-40 mL IntraVENous PRN    magnesium hydroxide (MILK OF MAGNESIA) 400 mg/5 mL oral suspension 30 mL  30 mL Oral DAILY PRN    docusate sodium (COLACE) capsule 100 mg  100 mg Oral BID    aspirin chewable tablet 81 mg  81 mg Oral DAILY    sodium chloride (NS) flush 5-40 mL  5-40 mL IntraVENous Q8H    sodium chloride (NS) flush 5-40 mL  5-40 mL IntraVENous PRN    acetaminophen (TYLENOL) tablet 650 mg  650 mg Oral Q6H PRN    Or    acetaminophen (TYLENOL) suppository 650 mg  650 mg Rectal Q6H PRN    polyethylene glycol (MIRALAX) packet 17 g  17 g Oral DAILY PRN    ondansetron (ZOFRAN ODT) tablet 4 mg  4 mg Oral Q8H PRN    Or    ondansetron (ZOFRAN) injection 4 mg  4 mg IntraVENous Q6H PRN    heparin (porcine) 25,000 units in 0.45% saline 250 ml infusion  12-25 Units/kg/hr IntraVENous TITRATE               Lab/Data Reviewed:       Recent Labs     08/08/22 0357 08/07/22 0316 08/06/22 2000   WBC 7.2 9.3 8.4   HGB 16.3* 16.1* 17.1*   HCT 48.5* 47.6 50.4*    171 162     Recent Labs     08/08/22 0357 08/07/22 0316 08/06/22 2000    138 136   K 4.3 4.0 3.9    103 102   CO2 27 28 27   GLU 92 88 107*   BUN 18 21* 21*   CREA 1.22 1.12 1.27   CA 8.9 8.7 9.1      Total time spent 45 minutes    Signed By: Cristian Oshea MD     August 8, 2022

## 2022-08-08 NOTE — CONSULTS
Comprehensive Nutrition Assessment    Type and Reason for Visit: Initial, Consult    Nutrition Recommendations/Plan:   Add oral nutrition supplements: Ensure Enlive BID. Monitor po intake and diet tolerance. Malnutrition Assessment:  Malnutrition Status:  Insufficient data (08/08/22 1543)      Nutrition History and Allergies:   Past medical history of HLD, GERD, colectomy due to large polyp (2009), BPH, secondary polycythemia, lower extremity DVT, admitted with c/o progressive worsening episodes of diaphoresis with chest pain over the past several days PTA. Weight history per chart review: 170 lb (12/28/17), 170 lb (9/27/19), 171 lb (6/28/21, 7/16/21), 168 lb (8/6/22)- per patient stated weight on admission, 184 lb (8/8/22)- measured bed weight on admission. [facility]     Nutrition Assessment:    Patient consuming most of recent meals, are more than 75% of meals yesterday. Admitted for non-STEMI with plan for cardiac catheterization today versus tomorrow. Disposition plan for patient to discharge home anticipated in the next 48 hours. TG 56 mg/dL (8/7/22), cholesterol 239 mg/dL (8/7/22). Troponin remains elevated. Nutrition Related Findings:    Last BM PTA. Findings noted: electrocardiogram showed no evidence of ST segment elevation. His troponin is elevated. Pertinent Medications: Lipitor, SSI, colace BID, milk of magnesia, pantoprazole, miralax prn, cholecalciferol, cyanocobalamin Wound Type: None    Current Nutrition Intake & Therapies:  Average Meal Intake: %  Average Supplement Intake: None ordered  ADULT DIET Regular; Low Fat/Low Chol/High Fiber/2 gm Na    Anthropometric Measures:  Height: 5' 10\" (177.8 cm)  Ideal Body Weight (IBW): 166 lbs (75 kg)  Admission Body Weight: 168 lb 7.6 oz  Current Body Wt:  83.6 kg (184 lb 4.9 oz), 111 % IBW.  Bed scale  Current BMI (kg/m2): 26.4  Usual Body Weight: 77.6 kg (171 lb)  % Weight Change (Calculated): 7.8  Weight Adjustment: No adjustment  BMI Category: Overweight (BMI 25.0-29. 9)    Estimated Daily Nutrient Needs:  Energy Requirements Based On: Formula  Weight Used for Energy Requirements: Admission  Energy (kcal/day): 9069-3798  Weight Used for Protein Requirements: Admission  Protein (g/day): 61-76  Method Used for Fluid Requirements: 1 ml/kcal  Fluid (ml/day): [id number]    Nutrition Diagnosis:   Increased nutrient needs related to catabolic illness, cardiac dysfunction as evidenced by lab values (chest pain on admission, elevated cardiac enzymes)    Nutrition Interventions:   Food and/or Nutrient Delivery: Continue current diet, Start oral nutrition supplement, Vitamin supplement, Mineral supplement  Nutrition Education/Counseling: No recommendations at this time, Education not indicated  Coordination of Nutrition Care: Continue to monitor while inpatient    Goals:  Goals: Meet at least 75% of estimated needs, by next RD assessment    Nutrition Monitoring and Evaluation:   Behavioral-Environmental Outcomes: None identified  Food/Nutrient Intake Outcomes: Food and nutrient intake, Supplement intake, Vitamin/mineral intake  Physical Signs/Symptoms Outcomes: Biochemical data, GI status, Meal time behavior, Nutrition focused physical findings    Discharge Planning:     Too soon to determine    Yleena Devlin, [address] [place] [address], [facility]  Contact: [phone]

## 2022-08-08 NOTE — PROGRESS NOTES
Hospitalist Progress Note    Patient: Michael Ellington MRN: [id number]  CSN: [id number]    YOB: 1950  Age: 74 y.o. Sex: male    DOA: 8/6/2022 LOS:  LOS: 2 days          Chief Complaint:    Chest pain      Assessment/Plan     NSTEMI    Continue  aspirin, Lipitor ,  metoprolol and heparin   Morphine for pain    cardiac catheterization on Tuesday 8/9     BPH    NIDDM-on trulicity, metformin at home  Add accuchecks, ACHS, and SSI here as needed     GERD     Chronic LLE DVT's  on Eliquis this will be held while patient is on heparin drip can be resumed once discharge      Secondary polycythemia   On testosterone   Not a candidate for the replacement with hx of dvt    Continue tele monitoring  D/w cardiology      Disposition :  Patient Active Problem List   Diagnosis Code    Chest pain R07.9    Esophageal reflux K21.9    Hyperlipidemia E78.5    Impaired fasting glucose R73.01    Actinic keratosis L57.0    Polycythemia D75.1    DVT (deep venous thrombosis) ([facility]) I82.409    NSTEMI (non-ST elevated myocardial infarction) ([facility] [address] I21.4    BPH (benign prostatic hyperplasia) N40.0       Subjective:    Denies further chest pain  No SOB  Recent stressful event-loss of beloved pet  No new complaints today    Review of systems:    Constitutional: denies fevers, chills  Respiratory: denies SOB  Cardiovascular: denies chest pain, palpitations  Gastrointestinal: denies nausea, vomiting, diarrhea      Vital signs/Intake and Output:  Visit Vitals  BP (!) 107/58   Pulse 60   Temp 98.1 °F (36.7 °C)   Resp 20   Ht 5' 10\" (1.778 m)   Wt 83.6 kg (184 lb 4.8 oz)   SpO2 99%   BMI 26.44 kg/m²     Current Shift:  No intake/output data recorded.   Last three shifts:  08/06 1901 - 08/08 0700  In: 12 [P.O.:960]  Out: 450 [Urine:450]    Exam:    General: Well developed, alert, NAD, OX3  CVS:Regular rate and rhythm, no M/R/G, S1/S2 heard, no thrill  Lungs:Clear to auscultation bilaterally, no wheezes, rhonchi, or rales  Abdomen: Soft, Nontender, No distention,   Extremities: No C/C/E, pulses palpable 2+  Neuro:grossly normal , follows commands  Psych:appropriate                Labs: Results:       Chemistry Recent Labs     08/08/22 0357 08/07/22 0316 08/06/22 2000   GLU 92 88 107*    138 136   K 4.3 4.0 3.9    103 102   CO2 27 28 27   BUN 18 21* 21*   CREA 1.22 1.12 1.27   CA 8.9 8.7 9.1   AGAP 6 7 7   BUCR 15 19 17   AP 32* 30* 38*   TP 6.6 6.7 7.6   ALB 3.4 3.4 3.9   GLOB 3.2 3.3 3.7   AGRAT 1.1 1.0 1.1      CBC w/Diff Recent Labs     08/08/22 0357 08/07/22 0316 08/06/22 2000   WBC 7.2 9.3 8.4   RBC 5.33 5.25 5.53   HGB 16.3* 16.1* 17.1*   HCT 48.5* 47.6 50.4*    171 162   GRANS 56 59 67   LYMPH 29 29 21   EOS 3 2 1      Cardiac Enzymes No results for input(s): CPK, CKND1, KOFI in the last 72 hours. No lab exists for component: CKRMB, TROIP   Coagulation Recent Labs     08/08/22 0357 08/07/22 0751 08/07/22 0316 08/06/22 2000   PTP 14.1  --   --  13.6   INR 1.0  --   --  1.0   APTT 112.3* 95.8*   < > 27.2    < > = values in this interval not displayed. Lipid Panel Lab Results   Component Value Date/Time    Cholesterol, total 239 (H) 08/07/2022 03:16 AM    HDL Cholesterol 45 08/07/2022 03:16 AM    LDL, calculated 182.8 (H) 08/07/2022 03:16 AM    VLDL, calculated 11.2 08/07/2022 03:16 AM    Triglyceride 56 08/07/2022 03:16 AM    CHOL/HDL Ratio 5.3 (H) 08/07/2022 03:16 AM      BNP No results for input(s): BNPP in the last 72 hours.    Liver Enzymes Recent Labs     08/08/22 0357   TP 6.6   ALB 3.4   AP 32*      Thyroid Studies Lab Results   Component Value Date/Time    TSH 0.84 08/06/2022 08:00 PM        Procedures/imaging: see electronic medical records for all procedures/Xrays and details which were not copied into this note but were reviewed prior to creation of Jaclyn Orta MD

## 2022-08-09 LAB
ACT BLD: 237 SECS (ref 79–138)
ACT BLD: 254 SECS (ref 79–138)
ALBUMIN SERPL-MCNC: 3.4 G/DL (ref 3.4–5)
ALBUMIN/GLOB SERPL: 1.1 {RATIO} (ref 0.8–1.7)
ALP SERPL-CCNC: 32 U/L (ref 45–117)
ALT SERPL-CCNC: 36 U/L (ref 16–61)
ANION GAP SERPL CALC-SCNC: 5 MMOL/L (ref 3–18)
APTT PPP: 91.8 SEC (ref 23–36.4)
AST SERPL-CCNC: 38 U/L (ref 10–38)
ATRIAL RATE: 59 BPM
BASOPHILS # BLD: 0.1 K/UL (ref 0–0.1)
BASOPHILS NFR BLD: 1 % (ref 0–2)
BILIRUB SERPL-MCNC: 0.7 MG/DL (ref 0.2–1)
BUN SERPL-MCNC: 16 MG/DL (ref 7–18)
BUN/CREAT SERPL: 14 (ref 12–20)
CALCIUM SERPL-MCNC: 8.9 MG/DL (ref 8.5–10.1)
CALCULATED P AXIS, ECG09: 43 DEGREES
CALCULATED R AXIS, ECG10: 26 DEGREES
CALCULATED T AXIS, ECG11: 157 DEGREES
CHLORIDE SERPL-SCNC: 105 MMOL/L (ref 100–111)
CO2 SERPL-SCNC: 27 MMOL/L (ref 21–32)
CREAT SERPL-MCNC: 1.12 MG/DL (ref 0.6–1.3)
DIAGNOSIS, 93000: NORMAL
DIFFERENTIAL METHOD BLD: ABNORMAL
EOSINOPHIL # BLD: 0.2 K/UL (ref 0–0.4)
EOSINOPHIL NFR BLD: 3 % (ref 0–5)
ERYTHROCYTE [DISTWIDTH] IN BLOOD BY AUTOMATED COUNT: 13.3 % (ref 11.6–14.5)
GLOBULIN SER CALC-MCNC: 3.2 G/DL (ref 2–4)
GLUCOSE BLD STRIP.AUTO-MCNC: 114 MG/DL (ref 70–110)
GLUCOSE BLD STRIP.AUTO-MCNC: 115 MG/DL (ref 70–110)
GLUCOSE SERPL-MCNC: 86 MG/DL (ref 74–99)
HCT VFR BLD AUTO: 47.7 % (ref 36–48)
HCT VFR BLD AUTO: 48.3 % (ref 36–48)
HGB BLD-MCNC: 16.1 G/DL (ref 13–16)
HGB BLD-MCNC: 16.3 G/DL (ref 13–16)
IMM GRANULOCYTES # BLD AUTO: 0.1 K/UL (ref 0–0.04)
IMM GRANULOCYTES NFR BLD AUTO: 1 % (ref 0–0.5)
LYMPHOCYTES # BLD: 2 K/UL (ref 0.9–3.6)
LYMPHOCYTES NFR BLD: 28 % (ref 21–52)
MAGNESIUM SERPL-MCNC: 2.2 MG/DL (ref 1.6–2.6)
MCH RBC QN AUTO: 30.7 PG (ref 24–34)
MCHC RBC AUTO-ENTMCNC: 33.7 G/DL (ref 31–37)
MCV RBC AUTO: 91 FL (ref 78–100)
MONOCYTES # BLD: 0.8 K/UL (ref 0.05–1.2)
MONOCYTES NFR BLD: 11 % (ref 3–10)
NEUTS SEG # BLD: 4 K/UL (ref 1.8–8)
NEUTS SEG NFR BLD: 56 % (ref 40–73)
NRBC # BLD: 0 K/UL (ref 0–0.01)
NRBC BLD-RTO: 0 PER 100 WBC
P-R INTERVAL, ECG05: 250 MS
PLATELET # BLD AUTO: 146 K/UL (ref 135–420)
PMV BLD AUTO: 10.2 FL (ref 9.2–11.8)
POTASSIUM SERPL-SCNC: 4.2 MMOL/L (ref 3.5–5.5)
PROT SERPL-MCNC: 6.6 G/DL (ref 6.4–8.2)
Q-T INTERVAL, ECG07: 414 MS
QRS DURATION, ECG06: 76 MS
QTC CALCULATION (BEZET), ECG08: 409 MS
RBC # BLD AUTO: 5.31 M/UL (ref 4.35–5.65)
SODIUM SERPL-SCNC: 137 MMOL/L (ref 136–145)
VENTRICULAR RATE, ECG03: 59 BPM
WBC # BLD AUTO: 7.1 K/UL (ref 4.6–13.2)

## 2022-08-09 PROCEDURE — 74011250636 HC RX REV CODE- 250/636: Performed by: INTERNAL MEDICINE

## 2022-08-09 PROCEDURE — 74011250637 HC RX REV CODE- 250/637: Performed by: INTERNAL MEDICINE

## 2022-08-09 PROCEDURE — C1769 GUIDE WIRE: HCPCS | Performed by: INTERNAL MEDICINE

## 2022-08-09 PROCEDURE — C1725 CATH, TRANSLUMIN NON-LASER: HCPCS | Performed by: INTERNAL MEDICINE

## 2022-08-09 PROCEDURE — 85025 COMPLETE CBC W/AUTO DIFF WBC: CPT

## 2022-08-09 PROCEDURE — 77030016699 HC CATH ANGI DX INFN1 CARD -A: Performed by: INTERNAL MEDICINE

## 2022-08-09 PROCEDURE — C1887 CATHETER, GUIDING: HCPCS | Performed by: INTERNAL MEDICINE

## 2022-08-09 PROCEDURE — 65270000046 HC RM TELEMETRY

## 2022-08-09 PROCEDURE — 77030012468 HC VLV BLEEDBK CNTRL ABBT -B: Performed by: INTERNAL MEDICINE

## 2022-08-09 PROCEDURE — 74011000250 HC RX REV CODE- 250: Performed by: INTERNAL MEDICINE

## 2022-08-09 PROCEDURE — 77030008543 HC TBNG MON PRSS MRTM -A: Performed by: INTERNAL MEDICINE

## 2022-08-09 PROCEDURE — 027034Z DILATION OF CORONARY ARTERY, ONE ARTERY WITH DRUG-ELUTING INTRALUMINAL DEVICE, PERCUTANEOUS APPROACH: ICD-10-PCS | Performed by: INTERNAL MEDICINE

## 2022-08-09 PROCEDURE — 83735 ASSAY OF MAGNESIUM: CPT

## 2022-08-09 PROCEDURE — 80053 COMPREHEN METABOLIC PANEL: CPT

## 2022-08-09 PROCEDURE — 77030004522 HC CATH ANGI DX EXPO BSC -A: Performed by: INTERNAL MEDICINE

## 2022-08-09 PROCEDURE — C1894 INTRO/SHEATH, NON-LASER: HCPCS | Performed by: INTERNAL MEDICINE

## 2022-08-09 PROCEDURE — 82962 GLUCOSE BLOOD TEST: CPT

## 2022-08-09 PROCEDURE — 74011250637 HC RX REV CODE- 250/637: Performed by: HOSPITALIST

## 2022-08-09 PROCEDURE — 4A023N7 MEASUREMENT OF CARDIAC SAMPLING AND PRESSURE, LEFT HEART, PERCUTANEOUS APPROACH: ICD-10-PCS | Performed by: INTERNAL MEDICINE

## 2022-08-09 PROCEDURE — 36415 COLL VENOUS BLD VENIPUNCTURE: CPT

## 2022-08-09 PROCEDURE — 85018 HEMOGLOBIN: CPT

## 2022-08-09 PROCEDURE — 93458 L HRT ARTERY/VENTRICLE ANGIO: CPT | Performed by: INTERNAL MEDICINE

## 2022-08-09 PROCEDURE — C9113 INJ PANTOPRAZOLE SODIUM, VIA: HCPCS | Performed by: INTERNAL MEDICINE

## 2022-08-09 PROCEDURE — 74011000636 HC RX REV CODE- 636: Performed by: INTERNAL MEDICINE

## 2022-08-09 PROCEDURE — 99153 MOD SED SAME PHYS/QHP EA: CPT | Performed by: INTERNAL MEDICINE

## 2022-08-09 PROCEDURE — 85730 THROMBOPLASTIN TIME PARTIAL: CPT

## 2022-08-09 PROCEDURE — B2111ZZ FLUOROSCOPY OF MULTIPLE CORONARY ARTERIES USING LOW OSMOLAR CONTRAST: ICD-10-PCS | Performed by: INTERNAL MEDICINE

## 2022-08-09 PROCEDURE — 93005 ELECTROCARDIOGRAM TRACING: CPT

## 2022-08-09 PROCEDURE — 77030013797 HC KT TRNSDUC PRSSR EDWD -A: Performed by: INTERNAL MEDICINE

## 2022-08-09 PROCEDURE — 99152 MOD SED SAME PHYS/QHP 5/>YRS: CPT | Performed by: INTERNAL MEDICINE

## 2022-08-09 PROCEDURE — 85347 COAGULATION TIME ACTIVATED: CPT

## 2022-08-09 PROCEDURE — 92928 PRQ TCAT PLMT NTRAC ST 1 LES: CPT | Performed by: INTERNAL MEDICINE

## 2022-08-09 PROCEDURE — C1874 STENT, COATED/COV W/DEL SYS: HCPCS | Performed by: INTERNAL MEDICINE

## 2022-08-09 PROCEDURE — 77030010221 HC SPLNT WR POS TELE -B: Performed by: INTERNAL MEDICINE

## 2022-08-09 DEVICE — STENT RONYX35022UX RESOLUTE ONYX 3.50X22
Type: IMPLANTABLE DEVICE | Status: FUNCTIONAL
Brand: RESOLUTE ONYX™

## 2022-08-09 RX ORDER — SODIUM CHLORIDE 0.9 % (FLUSH) 0.9 %
5-40 SYRINGE (ML) INJECTION EVERY 8 HOURS
Status: CANCELLED | OUTPATIENT
Start: 2022-08-09

## 2022-08-09 RX ORDER — ACETYLCYSTEINE 600 MG
CAPSULE ORAL 2 TIMES DAILY
COMMUNITY
End: 2022-08-11

## 2022-08-09 RX ORDER — ASPIRIN 81 MG/1
81 TABLET ORAL DAILY
Status: DISCONTINUED | OUTPATIENT
Start: 2022-08-10 | End: 2022-08-11 | Stop reason: HOSPADM

## 2022-08-09 RX ORDER — HEPARIN SODIUM 1000 [USP'U]/ML
INJECTION, SOLUTION INTRAVENOUS; SUBCUTANEOUS AS NEEDED
Status: DISCONTINUED | OUTPATIENT
Start: 2022-08-09 | End: 2022-08-09 | Stop reason: HOSPADM

## 2022-08-09 RX ORDER — EPTIFIBATIDE 0.75 MG/ML
2 INJECTION, SOLUTION INTRAVENOUS CONTINUOUS
Status: ACTIVE | OUTPATIENT
Start: 2022-08-09 | End: 2022-08-09

## 2022-08-09 RX ORDER — CLOPIDOGREL 300 MG/1
TABLET, FILM COATED ORAL AS NEEDED
Status: DISCONTINUED | OUTPATIENT
Start: 2022-08-09 | End: 2022-08-09 | Stop reason: HOSPADM

## 2022-08-09 RX ORDER — ENOXAPARIN SODIUM 100 MG/ML
1 INJECTION SUBCUTANEOUS EVERY 12 HOURS
Status: DISCONTINUED | OUTPATIENT
Start: 2022-08-09 | End: 2022-08-11 | Stop reason: HOSPADM

## 2022-08-09 RX ORDER — EPTIFIBATIDE 0.75 MG/ML
INJECTION, SOLUTION INTRAVENOUS
Status: COMPLETED | OUTPATIENT
Start: 2022-08-09 | End: 2022-08-09

## 2022-08-09 RX ORDER — CLOPIDOGREL BISULFATE 75 MG/1
75 TABLET ORAL DAILY
Status: DISCONTINUED | OUTPATIENT
Start: 2022-08-10 | End: 2022-08-11 | Stop reason: HOSPADM

## 2022-08-09 RX ORDER — FENTANYL CITRATE 50 UG/ML
INJECTION, SOLUTION INTRAMUSCULAR; INTRAVENOUS AS NEEDED
Status: DISCONTINUED | OUTPATIENT
Start: 2022-08-09 | End: 2022-08-09 | Stop reason: HOSPADM

## 2022-08-09 RX ORDER — TADALAFIL 10 MG/1
10 TABLET ORAL
COMMUNITY
End: 2022-08-11

## 2022-08-09 RX ORDER — EPTIFIBATIDE 2 MG/ML
INJECTION, SOLUTION INTRAVENOUS AS NEEDED
Status: DISCONTINUED | OUTPATIENT
Start: 2022-08-09 | End: 2022-08-09 | Stop reason: HOSPADM

## 2022-08-09 RX ORDER — VERAPAMIL HYDROCHLORIDE 2.5 MG/ML
INJECTION, SOLUTION INTRAVENOUS AS NEEDED
Status: DISCONTINUED | OUTPATIENT
Start: 2022-08-09 | End: 2022-08-09 | Stop reason: HOSPADM

## 2022-08-09 RX ORDER — TESTOSTERONE CYPIONATE, ALCOHOL 200 MG/ML
KIT INTRAMUSCULAR
COMMUNITY
End: 2022-08-11

## 2022-08-09 RX ORDER — SODIUM CHLORIDE 9 MG/ML
75 INJECTION, SOLUTION INTRAVENOUS CONTINUOUS
Status: DISPENSED | OUTPATIENT
Start: 2022-08-09 | End: 2022-08-10

## 2022-08-09 RX ORDER — MIDAZOLAM HYDROCHLORIDE 1 MG/ML
INJECTION, SOLUTION INTRAMUSCULAR; INTRAVENOUS AS NEEDED
Status: DISCONTINUED | OUTPATIENT
Start: 2022-08-09 | End: 2022-08-09 | Stop reason: HOSPADM

## 2022-08-09 RX ORDER — GUAIFENESIN 100 MG/5ML
LIQUID (ML) ORAL AS NEEDED
Status: DISCONTINUED | OUTPATIENT
Start: 2022-08-09 | End: 2022-08-09 | Stop reason: HOSPADM

## 2022-08-09 RX ORDER — ACETAMINOPHEN 325 MG/1
650 TABLET ORAL
Status: DISCONTINUED | OUTPATIENT
Start: 2022-08-09 | End: 2022-08-11 | Stop reason: HOSPADM

## 2022-08-09 RX ORDER — SODIUM CHLORIDE 0.9 % (FLUSH) 0.9 %
5-40 SYRINGE (ML) INJECTION AS NEEDED
Status: CANCELLED | OUTPATIENT
Start: 2022-08-09

## 2022-08-09 RX ORDER — LIDOCAINE HYDROCHLORIDE 10 MG/ML
INJECTION INFILTRATION; PERINEURAL AS NEEDED
Status: DISCONTINUED | OUTPATIENT
Start: 2022-08-09 | End: 2022-08-09 | Stop reason: HOSPADM

## 2022-08-09 RX ORDER — TAMSULOSIN HYDROCHLORIDE 0.4 MG/1
0.4 CAPSULE ORAL DAILY
COMMUNITY

## 2022-08-09 RX ADMIN — DOCUSATE SODIUM 100 MG: 100 CAPSULE ORAL at 21:56

## 2022-08-09 RX ADMIN — SODIUM CHLORIDE, PRESERVATIVE FREE 10 ML: 5 INJECTION INTRAVENOUS at 18:59

## 2022-08-09 RX ADMIN — METOPROLOL TARTRATE 12.5 MG: 25 TABLET, FILM COATED ORAL at 21:56

## 2022-08-09 RX ADMIN — SODIUM CHLORIDE, PRESERVATIVE FREE 10 ML: 5 INJECTION INTRAVENOUS at 21:57

## 2022-08-09 RX ADMIN — DOCUSATE SODIUM 100 MG: 100 CAPSULE ORAL at 10:44

## 2022-08-09 RX ADMIN — SODIUM CHLORIDE, PRESERVATIVE FREE 10 ML: 5 INJECTION INTRAVENOUS at 21:54

## 2022-08-09 RX ADMIN — MORPHINE SULFATE 2 MG: 2 INJECTION, SOLUTION INTRAMUSCULAR; INTRAVENOUS at 21:55

## 2022-08-09 RX ADMIN — SODIUM CHLORIDE 40 MG: 9 INJECTION INTRAMUSCULAR; INTRAVENOUS; SUBCUTANEOUS at 10:47

## 2022-08-09 RX ADMIN — ACETAMINOPHEN 650 MG: 325 TABLET ORAL at 11:57

## 2022-08-09 RX ADMIN — Medication 500 MCG: at 10:44

## 2022-08-09 RX ADMIN — ONDANSETRON 4 MG: 2 INJECTION INTRAMUSCULAR; INTRAVENOUS at 21:56

## 2022-08-09 RX ADMIN — METOPROLOL TARTRATE 12.5 MG: 25 TABLET, FILM COATED ORAL at 10:44

## 2022-08-09 RX ADMIN — ENOXAPARIN SODIUM 80 MG: 100 INJECTION SUBCUTANEOUS at 21:55

## 2022-08-09 RX ADMIN — ATORVASTATIN CALCIUM 40 MG: 20 TABLET, FILM COATED ORAL at 21:56

## 2022-08-09 RX ADMIN — SODIUM CHLORIDE 75 ML/HR: 900 INJECTION, SOLUTION INTRAVENOUS at 10:24

## 2022-08-09 RX ADMIN — Medication 1000 UNITS: at 10:44

## 2022-08-09 NOTE — PROGRESS NOTES
Dr Nia Crum updated on patient complaint of point tenderness mid sternum. Dr Nia Crum stated he was aware of the complaint and is witting follow up orders pertaining to patient\"s pain concerns.

## 2022-08-09 NOTE — PROCEDURES
[facility], Coronary angiogram and PCI of ostial-proximal Ramus done via left radial approach. Coronary angiogram revealed critical Ramus and branch vessel disease. PCI of  ostial-proximal Ramus done with ALEXUS with good result. LV gram was not done. LVEDP 6 mm hg. No significant gradient on pull back. Patient tolerated procedure well. Scant blood loss. No complications. No specimen removed. Recommendation:    Medication considered:   Aspirin, beta blocker, ACEI, Nitrates and statin   Plavix 600 mg one dose followed by 75 mg once a day  Integrilin for 8 hours  Dual antiplatelets for 12 months due to NSTEMI and use of ALEXUS  Resume anticoagulation for DVT from tonight   CAD risk factor education  Diet education  Control cholesterol  Blood pressure control  Exercise education: Age and functional status appropriate. Cardiac rehab referral done   Plan discussed with patient.

## 2022-08-09 NOTE — PROGRESS NOTES
D/C Plan: Anticipate home with family assistance when medically ready within the next 24-48 hours    CM notes patient had cardiac cath today. CM to continue to follow and remain available. Care Management Interventions  PCP Verified by CM:  Yes  Transition of Care Consult (CM Consult): Discharge Planning  Support Systems: Spouse/Significant Other, Child(jeanie)  Confirm Follow Up Transport: Family  The Plan for Transition of Care is Related to the Following Treatment Goals : NSTEMI  The Patient and/or Patient Representative was Provided with a Choice of Provider and Agrees with the Discharge Plan?: Yes  Name of the Patient Representative Who was Provided with a Choice of Provider and Agrees with the Discharge Plan: Teresa Garland, patient  Discharge Location  Patient Expects to be Discharged to[de-identified] Home with family assistance

## 2022-08-09 NOTE — ROUTINE PROCESS
TRANSFER - OUT REPORT:    Verbal report given to WILFRID Brar RN(name) on Mikhail Nelson  being transferred to [facility] (unit) for routine progression of care       Report consisted of patients Situation, Background, Assessment and   Recommendations(SBAR). Information from the following report(s) SBAR, Kardex, Procedure Summary, Intake/Output, MAR, Recent Results, and Cardiac Rhythm SR  was reviewed with the receiving nurse. Lines:   Peripheral IV 08/06/22 Right Antecubital (Active)   Site Assessment Clean, dry, & intact 08/09/22 0715   Phlebitis Assessment 0 08/09/22 0715   Infiltration Assessment 0 08/09/22 0715   Dressing Status Clean, dry, & intact 08/09/22 0715   Dressing Type Tape;Transparent 08/09/22 0715   Hub Color/Line Status Pink;Flushed; Infusing 08/09/22 0715   Action Taken Open ports on tubing capped 08/09/22 0715   Alcohol Cap Used Yes 08/09/22 0715        Opportunity for questions and clarification was provided.       Patient transported with:   Monitor  Registered Nurse  Tech

## 2022-08-09 NOTE — PROGRESS NOTES
Hospitalist Progress Note    Patient: Latisha Henson MRN: [id number]  CSN: [id number]    YOB: 1950  Age: 74 y.o. Sex: male    DOA: 8/6/2022 LOS:  LOS: 3 days          Chief Complaint:    Chest pain      Assessment/Plan       NSTEMI     Continue  aspirin, Lipitor , metoprolol  Lovenox will resume tonight, NOAC for d/c as he was on  Plavix/asa     cardiac catheterization with ALEXUS done today     BPH     NIDDM-on trulicity, metformin at home  Add accuchecks, ACHS, and SSI here as needed     GERD     Chronic LLE DVT's      Secondary polycythemia   On testosterone   Not a candidate for the replacement with hx of dvt     Continue tele monitoring    Dispo: home tomorrow am      Disposition :  Patient Active Problem List   Diagnosis Code    Chest pain R07.9    Esophageal reflux K21.9    Hyperlipidemia E78.5    Impaired fasting glucose R73.01    Actinic keratosis L57.0    Polycythemia D75.1    DVT (deep venous thrombosis) ([facility]) I82.409    NSTEMI (non-ST elevated myocardial infarction) ([facility] [address] I21.4    BPH (benign prostatic hyperplasia) N40.0       Subjective:    He is resting after procedure  Denies CP, SOB, palp, HA    Review of systems:  Respiratory: denies SOB, cough  Cardiovascular: denies chest pain, palpitations  Gastrointestinal: denies nausea      Visit Vitals  /67 (BP 1 Location: Right upper arm, BP Patient Position: At rest)   Pulse 65   Temp 98 °F (36.7 °C)   Resp 20   Ht 5' 10\" (1.778 m)   Wt 82.7 kg (182 lb 5.1 oz)   SpO2 98%   BMI 26.16 kg/m²     Current Shift:  No intake/output data recorded.   Last three shifts:  08/07 1901 - 08/09 0700  In: 240 [P.O.:240]  Out: 200 [Urine:200]    Exam:    General: Well developed, alert, NAD, OX3  CVS:Regular rate and rhythm, no M/R/G, S1/S2 heard, no thrill  Lungs:Clear to auscultation bilaterally, no wheezes, rhonchi, or rales  Abdomen: Soft, Nontender, No distention, Normal Bowel sounds  Extremities: No C/C/E, pulses palpable 2+  Neuro:grossly normal , follows commands  Psych:appropriate                Labs: Results:       Chemistry Recent Labs     08/09/22 0444 08/08/22 0357 08/07/22 0316   GLU 86 92 88    138 138   K 4.2 4.3 4.0    105 103   CO2 27 27 28   BUN 16 18 21*   CREA 1.12 1.22 1.12   CA 8.9 8.9 8.7   AGAP 5 6 7   BUCR 14 15 19   AP 32* 32* 30*   TP 6.6 6.6 6.7   ALB 3.4 3.4 3.4   GLOB 3.2 3.2 3.3   AGRAT 1.1 1.1 1.0      CBC w/Diff Recent Labs     08/09/22 0444 08/08/22 0357 08/07/22 0316   WBC 7.1 7.2 9.3   RBC 5.31 5.33 5.25   HGB 16.3* 16.3* 16.1*   HCT 48.3* 48.5* 47.6    140 171   GRANS 56 56 59   LYMPH 28 29 29   EOS 3 3 2      Cardiac Enzymes No results for input(s): CPK, CKND1, KOFI in the last 72 hours. No lab exists for component: CKRMB, TROIP   Coagulation Recent Labs     08/09/22 0444 08/08/22 0943 08/08/22 0357 08/07/22 0316 08/06/22 2000   PTP  --   --  14.1  --  13.6   INR  --   --  1.0  --  1.0   APTT 91.8* 98.3* 112.3*   < > 27.2    < > = values in this interval not displayed. Lipid Panel Lab Results   Component Value Date/Time    Cholesterol, total 239 (H) 08/07/2022 03:16 AM    HDL Cholesterol 45 08/07/2022 03:16 AM    LDL, calculated 182.8 (H) 08/07/2022 03:16 AM    VLDL, calculated 11.2 08/07/2022 03:16 AM    Triglyceride 56 08/07/2022 03:16 AM    CHOL/HDL Ratio 5.3 (H) 08/07/2022 03:16 AM      BNP No results for input(s): BNPP in the last 72 hours.    Liver Enzymes Recent Labs     08/09/22 0444   TP 6.6   ALB 3.4   AP 32*      Thyroid Studies Lab Results   Component Value Date/Time    TSH 0.84 08/06/2022 08:00 PM        Procedures/imaging: see electronic medical records for all procedures/Xrays and details which were not copied into this note but were reviewed prior to creation of Nabila Bailey MD Ambulatory

## 2022-08-09 NOTE — DISCHARGE INSTRUCTIONS
Cardiac Catheterization/Angiography Discharge Instructions on 8/9/ 2022    *Check the puncture site frequently for swelling or bleeding. If you see any bleeding, lie down and apply pressure over the area with a clean town or washcloth. Notify your doctor for any redness, swelling, drainage or oozing from the puncture site. Notify your doctor for any fever or chills. *If the leg or arm with the puncture becomes cold, numb or painful, call Dr Reyes Gonzalez     *Activity should be limited for the next 48 hours. Climb stairs as little as possible and avoid any stooping, bending or strenuous activity for 48 hours. No heavy lifting (anything over 10 pounds) for three days. *Do not drive for 48 hours. *You may resume your usual diet. Drink more fluids than usual.    *Have a responsible person drive you home and stay with you for at least 24 hours after your heart catheterization/angiography. *You may remove the bandage from your Left Arm in 24 hours. You may shower in 24 hours. No tub baths, hot tubs or swimming for one week. Do not place any lotions, creams, powders, ointments over the puncture site for one week. You may place a clean band-aid over the puncture site each day for 5 days. Change this daily.

## 2022-08-09 NOTE — PROGRESS NOTES
Pt is all prepped and ready for procedure. 1000 Pt back to care unit S/P [facility] with PCI. Pt awake and alert and tolerated procedure well. Tr band to left wrist with immobilizer in place. No bleeding nor hematoma to site. Integrilin drip infusing. Precautions reinforced. 1157 Pt c/o aching dull pain to left wrist , tylenol 650 mg adm as ordered. 1200 Tr band removed and tolerated well. No bleeding nor hematoma noted to site. Sterile 2x2 with  tegaderm dressing to site. Precautions reinforced. 1330 Pt seen by both Dr Soto Esquivel and Dr Joanne Angulo. Pt will be taken back to room.

## 2022-08-09 NOTE — H&P
Date of Surgery Update:  Mikhail William was seen and examined. History and physical has been reviewed. The patient has been examined. There have been no significant clinical changes since the completion of the originally dated History and Physical.      Patient assessed and is candidate for moderate sedation.       Signed By: Dakotah Posadas MD     August 9, 2022 8:33 AM

## 2022-08-10 LAB
ALBUMIN SERPL-MCNC: 3.4 G/DL (ref 3.4–5)
ALBUMIN/GLOB SERPL: 1 {RATIO} (ref 0.8–1.7)
ALP SERPL-CCNC: 33 U/L (ref 45–117)
ALT SERPL-CCNC: 42 U/L (ref 16–61)
ANION GAP SERPL CALC-SCNC: 3 MMOL/L (ref 3–18)
APTT PPP: 34 SEC (ref 23–36.4)
AST SERPL-CCNC: 77 U/L (ref 10–38)
ATRIAL RATE: 63 BPM
ATRIAL RATE: 64 BPM
BASOPHILS # BLD: 0 K/UL (ref 0–0.1)
BASOPHILS NFR BLD: 1 % (ref 0–2)
BILIRUB SERPL-MCNC: 0.6 MG/DL (ref 0.2–1)
BUN SERPL-MCNC: 12 MG/DL (ref 7–18)
BUN/CREAT SERPL: 11 (ref 12–20)
CALCIUM SERPL-MCNC: 9 MG/DL (ref 8.5–10.1)
CALCULATED P AXIS, ECG09: 67 DEGREES
CALCULATED P AXIS, ECG09: 68 DEGREES
CALCULATED R AXIS, ECG10: 40 DEGREES
CALCULATED R AXIS, ECG10: 44 DEGREES
CALCULATED T AXIS, ECG11: -144 DEGREES
CALCULATED T AXIS, ECG11: 151 DEGREES
CHLORIDE SERPL-SCNC: 106 MMOL/L (ref 100–111)
CO2 SERPL-SCNC: 29 MMOL/L (ref 21–32)
CREAT SERPL-MCNC: 1.09 MG/DL (ref 0.6–1.3)
DIAGNOSIS, 93000: NORMAL
DIAGNOSIS, 93000: NORMAL
DIFFERENTIAL METHOD BLD: ABNORMAL
EOSINOPHIL # BLD: 0.2 K/UL (ref 0–0.4)
EOSINOPHIL NFR BLD: 3 % (ref 0–5)
ERYTHROCYTE [DISTWIDTH] IN BLOOD BY AUTOMATED COUNT: 13.2 % (ref 11.6–14.5)
GLOBULIN SER CALC-MCNC: 3.5 G/DL (ref 2–4)
GLUCOSE BLD STRIP.AUTO-MCNC: 120 MG/DL (ref 70–110)
GLUCOSE BLD STRIP.AUTO-MCNC: 127 MG/DL (ref 70–110)
GLUCOSE BLD STRIP.AUTO-MCNC: 147 MG/DL (ref 70–110)
GLUCOSE BLD STRIP.AUTO-MCNC: 158 MG/DL (ref 70–110)
GLUCOSE SERPL-MCNC: 96 MG/DL (ref 74–99)
HCT VFR BLD AUTO: 49.7 % (ref 36–48)
HGB BLD-MCNC: 16.6 G/DL (ref 13–16)
IMM GRANULOCYTES # BLD AUTO: 0 K/UL (ref 0–0.04)
IMM GRANULOCYTES NFR BLD AUTO: 0 % (ref 0–0.5)
LYMPHOCYTES # BLD: 1.3 K/UL (ref 0.9–3.6)
LYMPHOCYTES NFR BLD: 17 % (ref 21–52)
MAGNESIUM SERPL-MCNC: 2.2 MG/DL (ref 1.6–2.6)
MCH RBC QN AUTO: 30.2 PG (ref 24–34)
MCHC RBC AUTO-ENTMCNC: 33.4 G/DL (ref 31–37)
MCV RBC AUTO: 90.5 FL (ref 78–100)
MONOCYTES # BLD: 0.9 K/UL (ref 0.05–1.2)
MONOCYTES NFR BLD: 11 % (ref 3–10)
NEUTS SEG # BLD: 5.2 K/UL (ref 1.8–8)
NEUTS SEG NFR BLD: 69 % (ref 40–73)
NRBC # BLD: 0 K/UL (ref 0–0.01)
NRBC BLD-RTO: 0 PER 100 WBC
P-R INTERVAL, ECG05: 216 MS
P-R INTERVAL, ECG05: 238 MS
PLATELET # BLD AUTO: 152 K/UL (ref 135–420)
PMV BLD AUTO: 10.2 FL (ref 9.2–11.8)
POTASSIUM SERPL-SCNC: 4.3 MMOL/L (ref 3.5–5.5)
PROT SERPL-MCNC: 6.9 G/DL (ref 6.4–8.2)
Q-T INTERVAL, ECG07: 400 MS
Q-T INTERVAL, ECG07: 406 MS
QRS DURATION, ECG06: 84 MS
QRS DURATION, ECG06: 84 MS
QTC CALCULATION (BEZET), ECG08: 409 MS
QTC CALCULATION (BEZET), ECG08: 418 MS
RBC # BLD AUTO: 5.49 M/UL (ref 4.35–5.65)
SODIUM SERPL-SCNC: 138 MMOL/L (ref 136–145)
TROPONIN-HIGH SENSITIVITY: 8390 NG/L (ref 0–78)
VENTRICULAR RATE, ECG03: 63 BPM
VENTRICULAR RATE, ECG03: 64 BPM
WBC # BLD AUTO: 7.7 K/UL (ref 4.6–13.2)

## 2022-08-10 PROCEDURE — 85025 COMPLETE CBC W/AUTO DIFF WBC: CPT

## 2022-08-10 PROCEDURE — 84484 ASSAY OF TROPONIN QUANT: CPT

## 2022-08-10 PROCEDURE — 74011000250 HC RX REV CODE- 250: Performed by: INTERNAL MEDICINE

## 2022-08-10 PROCEDURE — 74011250636 HC RX REV CODE- 250/636: Performed by: INTERNAL MEDICINE

## 2022-08-10 PROCEDURE — 93005 ELECTROCARDIOGRAM TRACING: CPT

## 2022-08-10 PROCEDURE — 74011636637 HC RX REV CODE- 636/637: Performed by: HOSPITALIST

## 2022-08-10 PROCEDURE — 74011250637 HC RX REV CODE- 250/637: Performed by: INTERNAL MEDICINE

## 2022-08-10 PROCEDURE — C9113 INJ PANTOPRAZOLE SODIUM, VIA: HCPCS | Performed by: INTERNAL MEDICINE

## 2022-08-10 PROCEDURE — 82962 GLUCOSE BLOOD TEST: CPT

## 2022-08-10 PROCEDURE — 36415 COLL VENOUS BLD VENIPUNCTURE: CPT

## 2022-08-10 PROCEDURE — 85730 THROMBOPLASTIN TIME PARTIAL: CPT

## 2022-08-10 PROCEDURE — 80053 COMPREHEN METABOLIC PANEL: CPT

## 2022-08-10 PROCEDURE — 74011250637 HC RX REV CODE- 250/637: Performed by: HOSPITALIST

## 2022-08-10 PROCEDURE — 83735 ASSAY OF MAGNESIUM: CPT

## 2022-08-10 PROCEDURE — 65270000046 HC RM TELEMETRY

## 2022-08-10 RX ORDER — RANOLAZINE 500 MG/1
500 TABLET, EXTENDED RELEASE ORAL 2 TIMES DAILY
Status: DISCONTINUED | OUTPATIENT
Start: 2022-08-10 | End: 2022-08-11 | Stop reason: HOSPADM

## 2022-08-10 RX ORDER — PANTOPRAZOLE SODIUM 40 MG/1
40 TABLET, DELAYED RELEASE ORAL
Status: DISCONTINUED | OUTPATIENT
Start: 2022-08-11 | End: 2022-08-11 | Stop reason: HOSPADM

## 2022-08-10 RX ADMIN — ENOXAPARIN SODIUM 80 MG: 100 INJECTION SUBCUTANEOUS at 21:53

## 2022-08-10 RX ADMIN — SODIUM CHLORIDE, PRESERVATIVE FREE 10 ML: 5 INJECTION INTRAVENOUS at 05:44

## 2022-08-10 RX ADMIN — SODIUM CHLORIDE, PRESERVATIVE FREE 10 ML: 5 INJECTION INTRAVENOUS at 19:02

## 2022-08-10 RX ADMIN — SODIUM CHLORIDE, PRESERVATIVE FREE 10 ML: 5 INJECTION INTRAVENOUS at 21:54

## 2022-08-10 RX ADMIN — RANOLAZINE 500 MG: 500 TABLET, FILM COATED, EXTENDED RELEASE ORAL at 21:53

## 2022-08-10 RX ADMIN — ASPIRIN 81 MG: 81 TABLET, COATED ORAL at 11:31

## 2022-08-10 RX ADMIN — Medication 1000 UNITS: at 11:31

## 2022-08-10 RX ADMIN — CLOPIDOGREL BISULFATE 75 MG: 75 TABLET ORAL at 11:31

## 2022-08-10 RX ADMIN — ENOXAPARIN SODIUM 80 MG: 100 INJECTION SUBCUTANEOUS at 11:30

## 2022-08-10 RX ADMIN — SODIUM CHLORIDE 40 MG: 9 INJECTION INTRAMUSCULAR; INTRAVENOUS; SUBCUTANEOUS at 11:30

## 2022-08-10 RX ADMIN — Medication 500 MCG: at 11:31

## 2022-08-10 RX ADMIN — METOPROLOL TARTRATE 12.5 MG: 25 TABLET, FILM COATED ORAL at 21:53

## 2022-08-10 RX ADMIN — ATORVASTATIN CALCIUM 40 MG: 20 TABLET, FILM COATED ORAL at 21:53

## 2022-08-10 RX ADMIN — DOCUSATE SODIUM 100 MG: 100 CAPSULE ORAL at 11:31

## 2022-08-10 RX ADMIN — DOCUSATE SODIUM 100 MG: 100 CAPSULE ORAL at 21:53

## 2022-08-10 RX ADMIN — METOPROLOL TARTRATE 12.5 MG: 25 TABLET, FILM COATED ORAL at 11:31

## 2022-08-10 NOTE — PROGRESS NOTES
Problem: Patient Education: Go to Patient Education Activity  Goal: Patient/Family Education  Outcome: Progressing Towards Goal     Problem: Unstable angina/NSTEMI: Day of Admission/Day 1  Goal: Off Pathway (Use only if patient is Off Pathway)  Outcome: Progressing Towards Goal  Goal: Activity/Safety  Outcome: Progressing Towards Goal  Goal: Consults, if ordered  Outcome: Progressing Towards Goal  Goal: Diagnostic Test/Procedures  Outcome: Progressing Towards Goal  Goal: Nutrition/Diet  Outcome: Progressing Towards Goal  Goal: Discharge Planning  Outcome: Progressing Towards Goal  Goal: Medications  Outcome: Progressing Towards Goal  Goal: Respiratory  Outcome: Progressing Towards Goal  Goal: Treatments/Interventions/Procedures  Outcome: Progressing Towards Goal  Goal: Psychosocial  Outcome: Progressing Towards Goal  Goal: *Hemodynamically stable  Outcome: Progressing Towards Goal  Goal: *Optimal pain control at patient's stated goal  Outcome: Progressing Towards Goal  Goal: *Lungs clear or at baseline  Outcome: Progressing Towards Goal     Problem: Unstable angina/NSTEMI: Day 2  Goal: Off Pathway (Use only if patient is Off Pathway)  Outcome: Progressing Towards Goal  Goal: Activity/Safety  Outcome: Progressing Towards Goal  Goal: Consults, if ordered  Outcome: Progressing Towards Goal  Goal: Diagnostic Test/Procedures  Outcome: Progressing Towards Goal  Goal: Nutrition/Diet  Outcome: Progressing Towards Goal  Goal: Discharge Planning  Outcome: Progressing Towards Goal  Goal: Medications  Outcome: Progressing Towards Goal  Goal: Respiratory  Outcome: Progressing Towards Goal  Goal: Treatments/Interventions/Procedures  Outcome: Progressing Towards Goal  Goal: Psychosocial  Outcome: Progressing Towards Goal  Goal: *Hemodynamically stable  Outcome: Progressing Towards Goal  Goal: *Optimal pain control at patient's stated goal  Outcome: Progressing Towards Goal  Goal: *Lungs clear or at baseline  Outcome: Progressing Towards Goal     Problem: Unstable Angina/NSTEMI: Discharge Outcomes  Goal: *Hemodynamically stable  Outcome: Progressing Towards Goal  Goal: *Stable cardiac rhythm  Outcome: Progressing Towards Goal  Goal: *Lungs clear or at baseline  Outcome: Progressing Towards Goal  Goal: *Optimal pain control at patient's stated goal  Outcome: Progressing Towards Goal  Goal: *Identifies cardiac risk factors  Outcome: Progressing Towards Goal  Goal: *Verbalizes home exercise program, activity guidelines, cardiac precautions  Outcome: Progressing Towards Goal  Goal: *Verbalizes understanding and describes prescribed diet  Outcome: Progressing Towards Goal  Goal: *Verbalizes name, dosage, time, side effects, and number of days to continue medications  Outcome: Progressing Towards Goal  Goal: *Anxiety reduced or absent  Outcome: Progressing Towards Goal  Goal: *Understands and describes signs and symptoms to report to providers(Stroke Metric)  Outcome: Progressing Towards Goal  Goal: *Describes follow-up/return visits to physicians  Outcome: Progressing Towards Goal  Goal: *Describes available resources and support systems  Outcome: Progressing Towards Goal  Goal: *Influenza immunization  Outcome: Progressing Towards Goal  Goal: *Pneumococcal immunization  Outcome: Progressing Towards Goal  Goal: *Describes smoking cessation resources  Outcome: Progressing Towards Goal     Problem: Falls - Risk of  Goal: *Absence of Falls  Description: Document Miri Fall Risk and appropriate interventions in the flowsheet.   Outcome: Progressing Towards Goal  Note: Fall Risk Interventions:            Medication Interventions: Patient to call before getting OOB         History of Falls Interventions: Room close to nurse's station         Problem: Patient Education: Go to Patient Education Activity  Goal: Patient/Family Education  Outcome: Progressing Towards Goal     Problem: Nutrition Deficit  Goal: *Optimize nutritional status  Outcome: Progressing Towards Goal     Problem:  Moderate Sedation (Adult)  Goal: *Patent airway  Outcome: Progressing Towards Goal  Goal: *Adequate oxygenation  Outcome: Progressing Towards Goal  Goal: *Absence of aspiration  Outcome: Progressing Towards Goal  Goal: *Hemodynamically stable  Outcome: Progressing Towards Goal  Goal: *Optimal pain control at patient's stated goal  Outcome: Progressing Towards Goal  Goal: *Absence of nausea/vomiting  Outcome: Progressing Towards Goal  Goal: *Anxiety reduced or absent  Outcome: Progressing Towards Goal  Goal: *Absence of injury  Outcome: Progressing Towards Goal  Goal: *Level of consciousness returns to baseline  Outcome: Progressing Towards Goal  Goal: Interventions  Outcome: Progressing Towards Goal     Problem: Patient Education: Go to Patient Education Activity  Goal: Patient/Family Education  Outcome: Progressing Towards Goal     Problem: Patient Education: Go to Patient Education Activity  Goal: Patient/Family Education  Outcome: Progressing Towards Goal     Problem: Cath Lab Procedures: Pre-Procedure  Goal: Off Pathway (Use only if patient is Off Pathway)  Outcome: Progressing Towards Goal  Goal: Activity/Safety  Outcome: Progressing Towards Goal  Goal: Consults, if ordered  Outcome: Progressing Towards Goal  Goal: Diagnostic Test/Procedures  Outcome: Progressing Towards Goal  Goal: Nutrition/Diet  Outcome: Progressing Towards Goal  Goal: Discharge Planning  Outcome: Progressing Towards Goal  Goal: Medications  Outcome: Progressing Towards Goal  Goal: Respiratory  Outcome: Progressing Towards Goal  Goal: Treatments/Interventions/Procedures  Outcome: Progressing Towards Goal  Goal: Psychosocial  Outcome: Progressing Towards Goal  Goal: *Verbalize description of procedure  Outcome: Progressing Towards Goal  Goal: *Consent signed  Outcome: Progressing Towards Goal

## 2022-08-10 NOTE — PROGRESS NOTES
Cardiology Progress Note        Patient: Michael Ellington        Sex: male          DOA: 8/6/2022  YOB: 1950      Age:  74 y.o.        LOS:  LOS: 4 days       Patient seen examined, chart reviewed    Assessment/Plan     Patient Active Problem List   Diagnosis Code    Chest pain R07.9    Esophageal reflux K21.9    Hyperlipidemia E78.5    Impaired fasting glucose R73.01    Actinic keratosis L57.0    Polycythemia D75.1    DVT (deep venous thrombosis) ([facility]) I82.409    NSTEMI (non-ST elevated myocardial infarction) ([facility] [address] I21.4    BPH (benign prostatic hyperplasia) N40.0       I reviewed echocardiogram personally   Echocardiogram revealed inferior, basal and mid inferolateral hypokinesis with estimated LVEF 40-45%    EKG done today ST T changes in inferolateral leads     Cardiac catheterization was done yesterday revealed    Left Main   Left main has luminal irregularities. Left main trifurcates in LAD, Ramus and LCx   Left Anterior Descending   LAD has luminal irregularities. Proximal LAD is calcified vessel. D1 is small caliber bifurcating long vessel with proximal and mid 70% stenosis. Ramus Intermedius   Ramus lesion, 95% stenosed. Lesion is the culprit lesion. The lesion is type C, distal to major branch, eccentric and hazy. The lesion was not previously treated. There is severe plaque burden detected. The plaque feature of this lesion is eccentric. Left Circumflex   LCx is non dominant vessel. LCx has luminal irregularities. OM1 is small caliber vessel with ostial-proximal 80% stenosis. Right Coronary Artery   RCA is dominant vessel. RCA has luminal irregularities. Distal RCA has 50% stenosis in the bend. RPDA and RPLA are small caliber vessel with luminal irregularities. Intervention      Ramus lesion   Angioplasty   Angioplasty using a standard balloon was performed prior to stent deployment. The balloon used was a CATH BLLN DIL 2.5 X12MM RX -- EUPHORA.  Lesion complication(s): no complications. Stent   A single stent was placed. Drug-eluting stent was successfully placed. The stent used was a STENT COR ALEXUS [id number] -- ALEXUS RESOLUTE BRITTANY. The strut is well apposed. Angioplasty   Angioplasty using a standard balloon was performed following stent deployment. The balloon used was a CATH MARY BLLN DIL 3.5X12MM -- NC EUPHORA. The second balloon used was a CATHETER BALLOON DIL  CM 3.75X8 MM NADYA LELAND. Lesion complication(s): no complications. Post-Intervention Lesion Assessment   The intervention was successful. The guidewire crossed the lesion. Device was deployed. The pre-interventional distal flow is decreased (JIM 2). Post-intervention JIM flow is 3. There were no complications. There is a 0% residual stenosis post intervention. Left Ventricle LV gram was not done. LVEDP 6 mm hg. No significant gradient on pull back. Plan:    Continue aspirin, Plavix, Metoprolol, atorvastatin and full dose of Lovenox  Start Ranolazine 500 mg twice a day   Troponin was downtrending now elevated post PCI, will repeat cardiac enzyme tomorrow AM  Advised about diet               Subjective:    cc: Had episode of left sided chest pain, reproducible on palpitation last night, denies any chest pain or shortness of breath now       REVIEW OF SYSTEMS:     General: No fevers or chills. Cardiovascular:  positive chest pain,No palpitations, No orthopnea, No PND, No leg swelling, No claudication  Pulmonary: No  dyspnea. Gastrointestinal: No nausea, vomiting, bleeding  Neurology: No Dizziness    Objective:      Visit Vitals  /75   Pulse 80   Temp 98.3 °F (36.8 °C)   Resp 18   Ht 5' 10\" (1.778 m)   Wt 80.6 kg (177 lb 12.8 oz)   SpO2 100%   BMI 25.51 kg/m²     Body mass index is 25.51 kg/m². Physical Exam:  General Appearance: Comfortable, not using accessory muscles of respiration. HEENT: MATTY. HEAD: Atraumatic  NECK: No JVD, no thyroidomeglay.  CAROTIDS: no bruit  LUNGS: Clear bilaterally. HEART: S1+S2 audible, no murmur, no pericardial rub. ABD: Non-tender, BS Audible    EXT: No edema, and no cyanosis. VASCULAR EXAM: Pulses are intact. Left wist: No swelling, no hematoma, no ecchymosis, no tenderness, left radial pulse +, normal motor and sensory exam of left hand     PSYCHIATRIC EXAM: Mood is appropriate. MUSCULOSKELETAL: Grossly no joint deformity.   NEUROLOGICAL: AAO times 3, No motor and sensory deficit    Medication:  Current Facility-Administered Medications   Medication Dose Route Frequency    [START ON 8/11/2022] pantoprazole (PROTONIX) tablet 40 mg  40 mg Oral ACB    ranolazine ER (RANEXA) tablet 500 mg  500 mg Oral BID    aspirin delayed-release tablet 81 mg  81 mg Oral DAILY    clopidogreL (PLAVIX) tablet 75 mg  75 mg Oral DAILY    enoxaparin (LOVENOX) injection 80 mg  1 mg/kg SubCUTAneous Q12H    acetaminophen (TYLENOL) tablet 650 mg  650 mg Oral Q6H PRN    cholecalciferol (VITAMIN D3) (1000 Units /25 mcg) tablet 1,000 Units  1,000 Units Oral DAILY    cyanocobalamin (VITAMIN B12) tablet 500 mcg  500 mcg Oral DAILY    insulin lispro (HUMALOG) injection   SubCUTAneous AC&HS    glucose chewable tablet 16 g  4 Tablet Oral PRN    glucagon (GLUCAGEN) injection 1 mg  1 mg IntraMUSCular PRN    dextrose 10% infusion 0-250 mL  0-250 mL IntraVENous PRN    atorvastatin (LIPITOR) tablet 40 mg  40 mg Oral QHS    metoprolol tartrate (LOPRESSOR) tablet 12.5 mg  12.5 mg Oral Q12H    morphine injection 2 mg  2 mg IntraVENous Q4H PRN    sodium chloride (NS) flush 5-40 mL  5-40 mL IntraVENous Q8H    sodium chloride (NS) flush 5-40 mL  5-40 mL IntraVENous PRN    magnesium hydroxide (MILK OF MAGNESIA) 400 mg/5 mL oral suspension 30 mL  30 mL Oral DAILY PRN    docusate sodium (COLACE) capsule 100 mg  100 mg Oral BID    sodium chloride (NS) flush 5-40 mL  5-40 mL IntraVENous Q8H    sodium chloride (NS) flush 5-40 mL  5-40 mL IntraVENous PRN    polyethylene glycol (MIRALAX) packet 17 g  17 g Oral DAILY PRN    ondansetron (ZOFRAN ODT) tablet 4 mg  4 mg Oral Q8H PRN    Or    ondansetron (ZOFRAN) injection 4 mg  4 mg IntraVENous Q6H PRN               Lab/Data Reviewed:       Recent Labs     08/10/22  0321 08/09/22  0835 08/09/22 0444 08/08/22  0357   WBC 7.7  --  7.1 7.2   HGB 16.6* 16.1* 16.3* 16.3*   HCT 49.7* 47.7 48.3* 48.5*     --  146 140       Recent Labs     08/10/22  0321 08/09/22  0444 08/08/22  0357    137 138   K 4.3 4.2 4.3    105 105   CO2 29 27 27   GLU 96 86 92   BUN 12 16 18   CREA 1.09 1.12 1.22   CA 9.0 8.9 8.9        Total time spent 40 minutes    Signed By: Jane Sheikh MD     August 10, 2022

## 2022-08-10 NOTE — PROGRESS NOTES
Hospitalist Progress Note    Patient: Karla Forbes MRN: [id number]  CSN: [id number]    YOB: 1950  Age: 74 y.o. Sex: male    DOA: 8/6/2022 LOS:  LOS: 4 days          Chief Complaint:    Chest pain  Had episode last night not relieved by morphine as much  Was reproducible with chest wall tenderness      Assessment/Plan       NSTEMI     Continue  aspirin, Lipitor , metoprolol  Lovenox will resume tonight, NOAC for d/c as he was on  Plavix/asa     cardiac catheterization with ALEXUS done    BPH     NIDDM-on trulicity, metformin at home  Add accuchecks, ACHS, and SSI here as needed     GERD     Chronic LLE DVT's      Secondary polycythemia   On testosterone   Not a candidate for the replacement with hx of dvt     Continue tele monitoring    Dispo: home today if ok with cardiology but check troponin due to chest pain last night      Disposition :tbd    Patient Active Problem List   Diagnosis Code    Chest pain R07.9    Esophageal reflux K21.9    Hyperlipidemia E78.5    Impaired fasting glucose R73.01    Actinic keratosis L57.0    Polycythemia D75.1    DVT (deep venous thrombosis) ([facility]) I82.409    NSTEMI (non-ST elevated myocardial infarction) ([facility] [address] I21.4    BPH (benign prostatic hyperplasia) N40.0       Subjective:    He is resting this morning  Had chest pain with muscle tenderness  Not relieved by morphine  simlar to on admission    Review of systems:  Respiratory: denies SOB, cough  Cardiovascular: +chest pain, palpitations  Gastrointestinal: denies nausea      Visit Vitals  /79   Pulse 73   Temp 98.6 °F (37 °C)   Resp 18   Ht 5' 10\" (1.778 m)   Wt 80.6 kg (177 lb 12.8 oz)   SpO2 98%   BMI 25.51 kg/m²     Current Shift:  No intake/output data recorded.   Last three shifts:  08/08 1901 - 08/10 0700  In: -   Out: 300 [Urine:300]    Exam:    General: Well developed, alert, NAD, OX3  CVS:Regular rate and rhythm, no M/R/G, S1/S2 heard, no thrill  Lungs:Clear to auscultation bilaterally, no wheezes, rhonchi, or rales  Abdomen: Soft, Nontender, No distention, Normal Bowel sounds  Extremities: No C/C/E, pulses palpable 2+  Neuro:grossly normal , follows commands  Psych:appropriate                Labs: Results:       Chemistry Recent Labs     08/10/22  0321 08/09/22  0444 08/08/22  0357   GLU 96 86 92    137 138   K 4.3 4.2 4.3    105 105   CO2 29 27 27   BUN 12 16 18   CREA 1.09 1.12 1.22   CA 9.0 8.9 8.9   AGAP 3 5 6   BUCR 11* 14 15   AP 33* 32* 32*   TP 6.9 6.6 6.6   ALB 3.4 3.4 3.4   GLOB 3.5 3.2 3.2   AGRAT 1.0 1.1 1.1        CBC w/Diff Recent Labs     08/10/22  0321 08/09/22  0835 08/09/22  0444 08/08/22  0357   WBC 7.7  --  7.1 7.2   RBC 5.49  --  5.31 5.33   HGB 16.6* 16.1* 16.3* 16.3*   HCT 49.7* 47.7 48.3* 48.5*     --  146 140   GRANS 69  --  56 56   LYMPH 17*  --  28 29   EOS 3  --  3 3        Cardiac Enzymes No results for input(s): CPK, CKND1, KOFI in the last 72 hours. No lab exists for component: CKRMB, TROIP   Coagulation Recent Labs     08/10/22  0321 08/09/22  0444 08/08/22  0943 08/08/22 0357   PTP  --   --   --  14.1   INR  --   --   --  1.0   APTT 34.0 91.8*   < > 112.3*    < > = values in this interval not displayed. Lipid Panel Lab Results   Component Value Date/Time    Cholesterol, total 239 (H) 08/07/2022 03:16 AM    HDL Cholesterol 45 08/07/2022 03:16 AM    LDL, calculated 182.8 (H) 08/07/2022 03:16 AM    VLDL, calculated 11.2 08/07/2022 03:16 AM    Triglyceride 56 08/07/2022 03:16 AM    CHOL/HDL Ratio 5.3 (H) 08/07/2022 03:16 AM      BNP No results for input(s): BNPP in the last 72 hours.    Liver Enzymes Recent Labs     08/10/22  0321   TP 6.9   ALB 3.4   AP 33*        Thyroid Studies Lab Results   Component Value Date/Time    TSH 0.84 08/06/2022 08:00 PM        Procedures/imaging: see electronic medical records for all procedures/Xrays and details which were not copied into this note but were reviewed prior to creation of Plan      [employer], MD

## 2022-08-10 NOTE — PROGRESS NOTES
.IV to PO Conversion Recommendation     Patient: Hui Pérez   MRN#: [id number]   Admission Date: 106955     IV TO PO  Medication(s) Pantoprazole   Oral Meds  Yes   Diet:     Active Orders   Diet    ADULT DIET Regular; Low Fat/Low Chol/High Fiber/PADMINI; Low Sodium (2 gm)      TEMP 98.3 °F (36.8 °C)   WBC Lab Results   Component Value Date/Time    WBC 7.7 08/10/2022 03:21 AM           Pharmacy Dosing Services:  Summary:   Does the patient meet all criteria for IV to PO switch as listed below? Yes   If no, list:     Is the patient excluded from IV to PO automatic switch based on criteria listed below? No   If yes, list:       Criteria for IV to PO switch - Antibiotics   Received IV therapy for at least 24 hours   2. Functioning GI tract   Taking scheduled oral medications  Tolerating diet more advanced than clear liquids   3. No signs/symptoms of shock  No vasopressor support    Criteria for IV to PO switch - Nonantibiotics   Functioning GI tract   Taking scheduled oral medications  Toleratind duet more advanced than clear liquids  2. No signs/symptoms of shock  No vasopressor support        3. No seizures for >72 hours (antiepileptic medications only)    Exclusion Criteria   Patient is being treated for an infection that requires IV therapy such as: endocarditis, osteomyelitis, meningitis, pancreatitis (antibiotics only)   NG tube with continous suction   Nausea and/or vomiting or severe diarrhea within past 24 hours  Malabsorption syndromes, partial or total gastrectomy, ileus, gastric outlet or bowel obstruction  Active GI bleeding   Significant painful oral ulceration  Unable to swallow  On total parenteral nutrition with a NPO order  NPO  Febrile in last 24 hours (antibiotics only)  Clinically deteriorating or unstable (antibiotics only)      Assessment/Recommendation:    Protonix 40 mg PO daily    This IV to PO conversion is based on the [facility] [employer] approved automatic conversion policy for eligible patients. Please call with questions.     Michelle Baxter [facility]  Clinical Pharmacist  [phone]

## 2022-08-11 VITALS
DIASTOLIC BLOOD PRESSURE: 74 MMHG | HEART RATE: 76 BPM | OXYGEN SATURATION: 96 % | WEIGHT: 177.8 LBS | SYSTOLIC BLOOD PRESSURE: 130 MMHG | RESPIRATION RATE: 18 BRPM | BODY MASS INDEX: 25.45 KG/M2 | HEIGHT: 70 IN | TEMPERATURE: 97.7 F

## 2022-08-11 LAB
ALBUMIN SERPL-MCNC: 3.5 G/DL (ref 3.4–5)
ALBUMIN/GLOB SERPL: 1 {RATIO} (ref 0.8–1.7)
ALP SERPL-CCNC: 37 U/L (ref 45–117)
ALT SERPL-CCNC: 59 U/L (ref 16–61)
ANION GAP SERPL CALC-SCNC: 5 MMOL/L (ref 3–18)
AST SERPL-CCNC: 109 U/L (ref 10–38)
BASOPHILS # BLD: 0 K/UL (ref 0–0.1)
BASOPHILS NFR BLD: 0 % (ref 0–2)
BILIRUB SERPL-MCNC: 0.5 MG/DL (ref 0.2–1)
BUN SERPL-MCNC: 16 MG/DL (ref 7–18)
BUN/CREAT SERPL: 14 (ref 12–20)
CALCIUM SERPL-MCNC: 9.6 MG/DL (ref 8.5–10.1)
CHLORIDE SERPL-SCNC: 103 MMOL/L (ref 100–111)
CO2 SERPL-SCNC: 29 MMOL/L (ref 21–32)
CREAT SERPL-MCNC: 1.13 MG/DL (ref 0.6–1.3)
DIFFERENTIAL METHOD BLD: ABNORMAL
EOSINOPHIL # BLD: 0.2 K/UL (ref 0–0.4)
EOSINOPHIL NFR BLD: 2 % (ref 0–5)
ERYTHROCYTE [DISTWIDTH] IN BLOOD BY AUTOMATED COUNT: 13.2 % (ref 11.6–14.5)
GLOBULIN SER CALC-MCNC: 3.5 G/DL (ref 2–4)
GLUCOSE BLD STRIP.AUTO-MCNC: 104 MG/DL (ref 70–110)
GLUCOSE BLD STRIP.AUTO-MCNC: 120 MG/DL (ref 70–110)
GLUCOSE SERPL-MCNC: 103 MG/DL (ref 74–99)
HCT VFR BLD AUTO: 50.9 % (ref 36–48)
HGB BLD-MCNC: 17.1 G/DL (ref 13–16)
IMM GRANULOCYTES # BLD AUTO: 0.1 K/UL (ref 0–0.04)
IMM GRANULOCYTES NFR BLD AUTO: 1 % (ref 0–0.5)
LYMPHOCYTES # BLD: 1.6 K/UL (ref 0.9–3.6)
LYMPHOCYTES NFR BLD: 17 % (ref 21–52)
MAGNESIUM SERPL-MCNC: 2.1 MG/DL (ref 1.6–2.6)
MCH RBC QN AUTO: 30.5 PG (ref 24–34)
MCHC RBC AUTO-ENTMCNC: 33.6 G/DL (ref 31–37)
MCV RBC AUTO: 90.9 FL (ref 78–100)
MONOCYTES # BLD: 1.1 K/UL (ref 0.05–1.2)
MONOCYTES NFR BLD: 12 % (ref 3–10)
NEUTS SEG # BLD: 6.4 K/UL (ref 1.8–8)
NEUTS SEG NFR BLD: 68 % (ref 40–73)
NRBC # BLD: 0 K/UL (ref 0–0.01)
NRBC BLD-RTO: 0 PER 100 WBC
PLATELET # BLD AUTO: 145 K/UL (ref 135–420)
PMV BLD AUTO: 10.3 FL (ref 9.2–11.8)
POTASSIUM SERPL-SCNC: 4.8 MMOL/L (ref 3.5–5.5)
PROT SERPL-MCNC: 7 G/DL (ref 6.4–8.2)
RBC # BLD AUTO: 5.6 M/UL (ref 4.35–5.65)
SODIUM SERPL-SCNC: 137 MMOL/L (ref 136–145)
TROPONIN-HIGH SENSITIVITY: 6312 NG/L (ref 0–78)
TROPONIN-HIGH SENSITIVITY: 8896 NG/L (ref 0–78)
WBC # BLD AUTO: 9.4 K/UL (ref 4.6–13.2)

## 2022-08-11 PROCEDURE — 83735 ASSAY OF MAGNESIUM: CPT

## 2022-08-11 PROCEDURE — 74011250636 HC RX REV CODE- 250/636: Performed by: INTERNAL MEDICINE

## 2022-08-11 PROCEDURE — 74011250637 HC RX REV CODE- 250/637: Performed by: HOSPITALIST

## 2022-08-11 PROCEDURE — 74011000250 HC RX REV CODE- 250: Performed by: INTERNAL MEDICINE

## 2022-08-11 PROCEDURE — 85025 COMPLETE CBC W/AUTO DIFF WBC: CPT

## 2022-08-11 PROCEDURE — 36415 COLL VENOUS BLD VENIPUNCTURE: CPT

## 2022-08-11 PROCEDURE — 80053 COMPREHEN METABOLIC PANEL: CPT

## 2022-08-11 PROCEDURE — 82962 GLUCOSE BLOOD TEST: CPT

## 2022-08-11 PROCEDURE — 84484 ASSAY OF TROPONIN QUANT: CPT

## 2022-08-11 PROCEDURE — 74011250637 HC RX REV CODE- 250/637: Performed by: INTERNAL MEDICINE

## 2022-08-11 RX ORDER — CLOPIDOGREL BISULFATE 75 MG/1
75 TABLET ORAL DAILY
Qty: 30 TABLET | Refills: 2 | Status: SHIPPED | OUTPATIENT
Start: 2022-08-11

## 2022-08-11 RX ORDER — METOPROLOL TARTRATE 25 MG/1
12.5 TABLET, FILM COATED ORAL EVERY 12 HOURS
Qty: 60 TABLET | Refills: 2 | Status: SHIPPED | OUTPATIENT
Start: 2022-08-11

## 2022-08-11 RX ORDER — RANOLAZINE 500 MG/1
500 TABLET, EXTENDED RELEASE ORAL 2 TIMES DAILY
Qty: 60 TABLET | Refills: 2 | Status: SHIPPED | OUTPATIENT
Start: 2022-08-11

## 2022-08-11 RX ORDER — ASPIRIN 81 MG/1
81 TABLET ORAL DAILY
Qty: 30 TABLET | Refills: 2 | Status: SHIPPED | OUTPATIENT
Start: 2022-08-11

## 2022-08-11 RX ORDER — ATORVASTATIN CALCIUM 40 MG/1
40 TABLET, FILM COATED ORAL
Qty: 30 TABLET | Refills: 2 | Status: SHIPPED | OUTPATIENT
Start: 2022-08-11

## 2022-08-11 RX ADMIN — CLOPIDOGREL BISULFATE 75 MG: 75 TABLET ORAL at 10:17

## 2022-08-11 RX ADMIN — PANTOPRAZOLE SODIUM 40 MG: 40 TABLET, DELAYED RELEASE ORAL at 10:17

## 2022-08-11 RX ADMIN — Medication 500 MCG: at 10:17

## 2022-08-11 RX ADMIN — SODIUM CHLORIDE, PRESERVATIVE FREE 10 ML: 5 INJECTION INTRAVENOUS at 05:04

## 2022-08-11 RX ADMIN — Medication 1000 UNITS: at 10:13

## 2022-08-11 RX ADMIN — ENOXAPARIN SODIUM 80 MG: 100 INJECTION SUBCUTANEOUS at 10:17

## 2022-08-11 RX ADMIN — SODIUM CHLORIDE, PRESERVATIVE FREE 10 ML: 5 INJECTION INTRAVENOUS at 05:03

## 2022-08-11 RX ADMIN — RANOLAZINE 500 MG: 500 TABLET, FILM COATED, EXTENDED RELEASE ORAL at 10:13

## 2022-08-11 RX ADMIN — METOPROLOL TARTRATE 12.5 MG: 25 TABLET, FILM COATED ORAL at 10:14

## 2022-08-11 RX ADMIN — ASPIRIN 81 MG: 81 TABLET, COATED ORAL at 10:17

## 2022-08-11 NOTE — PROGRESS NOTES
Hospitalist Progress Note    Patient: Mai Dickson MRN: [id number]  CSN: [id number]    YOB: 1950  Age: 74 y.o. Sex: male    DOA: 8/6/2022 LOS:  LOS: 5 days          Chief Complaint:    NSTEMI      Assessment/Plan     NSTEMI  CAD s/p ALEXUS  Chest pain  Hx of DVT/chronic DVT's  Polycythemia  BPH    Cardiac meds to continue-plavix, eliquis on d/c (lovenox here), asa, statin, beta blocker    No diabetes  Stop BG checks  A1C less than 6%    Repeat trop pending    D/c anticipated today  Disposition :  Patient Active Problem List   Diagnosis Code    Chest pain R07.9    Esophageal reflux K21.9    Hyperlipidemia E78.5    Impaired fasting glucose R73.01    Actinic keratosis L57.0    Polycythemia D75.1    DVT (deep venous thrombosis) ([facility]) I82.409    NSTEMI (non-ST elevated myocardial infarction) ([facility] [address] I21.4    BPH (benign prostatic hyperplasia) N40.0       Subjective:  I dont have diabetes  I feel fine  Ready for home  No chest pains      Review of systems:    Constitutional: denies fevers,  Respiratory: denies SOB, cough  Cardiovascular: denies chest pain, palpitations  Gastrointestinal: denies nausea, vomiting, diarrhea      Vital signs/Intake and Output:  Visit Vitals  /74   Pulse 80   Temp 97.8 °F (36.6 °C)   Resp 18   Ht 5' 10\" (1.778 m)   Wt 80.6 kg (177 lb 12.8 oz)   SpO2 99%   BMI 25.51 kg/m²     Current Shift:  No intake/output data recorded. Last three shifts:  No intake/output data recorded.     Exam:    General: Well developed, alert, NAD, OX3  CVS:Regular rate and rhythm, no M/R/G, S1/S2 heard, no thrill  Lungs:Clear to auscultation bilaterally, no wheezes, rhonchi, or rales  Abdomen: Soft, Nontender, No distention, Normal Bowel sounds  Extremities: No C/C/E, pulses palpable 2+  Neuro:grossly normal , follows commands  Psych:appropriate                Labs: Results:       Chemistry Recent Labs     08/11/22  0227 08/10/22  0321 08/09/22  0444   * 96 86    138 137   K 4.8 4.3 4.2    106 105   CO2 29 29 27   BUN 16 12 16   CREA 1.13 1.09 1.12   CA 9.6 9.0 8.9   AGAP 5 3 5   BUCR 14 11* 14   AP 37* 33* 32*   TP 7.0 6.9 6.6   ALB 3.5 3.4 3.4   GLOB 3.5 3.5 3.2   AGRAT 1.0 1.0 1.1      CBC w/Diff Recent Labs     08/11/22  0227 08/10/22  0321 08/09/22  0835 08/09/22 0444   WBC 9.4 7.7  --  7.1   RBC 5.60 5.49  --  5.31   HGB 17.1* 16.6* 16.1* 16.3*   HCT 50.9* 49.7* 47.7 48.3*    152  --  146   GRANS 68 69  --  56   LYMPH 17* 17*  --  28   EOS 2 3  --  3      Cardiac Enzymes No results for input(s): CPK, CKND1, KOFI in the last 72 hours. No lab exists for component: CKRMB, TROIP   Coagulation Recent Labs     08/10/22  0321 08/09/22  0444   APTT 34.0 91.8*       Lipid Panel Lab Results   Component Value Date/Time    Cholesterol, total 239 (H) 08/07/2022 03:16 AM    HDL Cholesterol 45 08/07/2022 03:16 AM    LDL, calculated 182.8 (H) 08/07/2022 03:16 AM    VLDL, calculated 11.2 08/07/2022 03:16 AM    Triglyceride 56 08/07/2022 03:16 AM    CHOL/HDL Ratio 5.3 (H) 08/07/2022 03:16 AM      BNP No results for input(s): BNPP in the last 72 hours.    Liver Enzymes Recent Labs     08/11/22 0227   TP 7.0   ALB 3.5   AP 37*      Thyroid Studies Lab Results   Component Value Date/Time    TSH 0.84 08/06/2022 08:00 PM        Procedures/imaging: see electronic medical records for all procedures/Xrays and details which were not copied into this note but were reviewed prior to creation of Eder Porter MD

## 2022-08-11 NOTE — PROGRESS NOTES
D/C Plan: Anticipate discharge home with family assistance within the next 24 hours    CM spoke with the patient who stated they will have a ride home today. Per patient, Dr. Kingsley Smith will come see him today prior to discharge. Care Management Interventions  PCP Verified by CM:  Yes  Transition of Care Consult (CM Consult): Discharge Planning  Support Systems: Spouse/Significant Other, Child(jeanie)  Confirm Follow Up Transport: Family  The Plan for Transition of Care is Related to the Following Treatment Goals : NSTEMI  The Patient and/or Patient Representative was Provided with a Choice of Provider and Agrees with the Discharge Plan?: Yes  Name of the Patient Representative Who was Provided with a Choice of Provider and Agrees with the Discharge Plan: Devyn Goldman, patient  Discharge Location  Patient Expects to be Discharged to[de-identified] Home with family assistance

## 2022-08-11 NOTE — DISCHARGE SUMMARY
[address] [facility] SUMMARY    Name:  Su Pacheco  MR#:   [id number]  :  1950  ACCOUNT #:  [de-identified]  ADMIT DATE:  2022  DISCHARGE DATE:    DISCHARGE DIAGNOSES:  1.  Non-ST-elevation myocardial infarction. 2.  Coronary artery disease status post drug-eluting stent placement. 3.  Hypertension. 4.  History of deep venous thrombosis, chronic deep venous thrombosis. CONSULTANTS:  Dr. Yahir Wilson, Cardiology. PROCEDURE:  Cardiac catheterization. HOSPITAL SUMMARY:  This is a 66-year-old gentleman who developed anginal equivalent pain after a stressful event of losing his family pet last week. He had associated diaphoresis with the chest pain. His son who is trained as an EMT urged him to come to the emergency room, although the patient did not think his pain was that significant. He has had a DVT before. He has completed at least 6 months of Eliquis therapy at full dose. 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 He is not a smoker or drinker. He is currently retired. He has a history also of BPH and hyperlipidemia. He is not a diabetic. The patient was admitted to the hospital for serial enzymes and telemetry monitoring. His enzymes were elevated consistent with non-ST elevation MI. Thus, he was placed on a heparin drip and taken off his Eliquis. The patient went for cardiac cath where he required a stent placement to the ostial proximal ramus, it was a drug-eluting stent. He had good success after that. He had one episode of chest pain the night postoperatively and subsequently, the troponins were trended again. They are coming down at this point, initial peak was 11,000, then down to 5993, on the evening of 08/10, he was up to 8390, now down to 6312. ALT is normal.  AST is slightly elevated at 109. Electrolytes are normal.  H and H are 17.1 and 50.9.   Today, he denies chest pain, palpitations, headache, shortness of breath, diaphoresis or nausea. He would like to go home. On exam, he is a well-developed  male, in no acute distress. His lungs are clear. Cardiac exam, regular rate and rhythm. Abdomen is soft and nontender. Lower extremities without edema. Pulse 76, temp 97.7, blood pressure 130/74, respiratory rate 18, SaO2 is 96% on room air. He is currently on aspirin, Lipitor, Plavix, Colace, Lovenox injections, Lopressor, Ranexa, and I have discussed the plan of care with Cardiology, who recommends that he reduce his Eliquis dose to 2.5 mg twice daily considering he will be on Plavix and aspirin as an outpatient. So with that after clearance by Cardiology, today he can discharge home on the following meds,  1. Eliquis 2.5 mg twice daily. 2.  Nexium 40 mg daily. 3.  Flomax 0.4 mg daily. 4.  Vitamin D 1000 units daily. 5.  Vitamin B12 500 mcg daily. 6.  Lipitor 40 mg at night. 7.  Aspirin 81 mg daily. 8.  Metoprolol 12.5 mg twice daily. 9.  Plavix 75 mg daily. 10.  Ranexa 500 mg twice daily. These meds have been sent to his pharmacy of choice. He is stable for release from the hospital on a cardiac diet with followup with Dr. Dianelys Torrez office, [facility], in 1 week and with Dr. Sherry Cline in 2-3 weeks in the office. Return to the emergency room for any chest pain, shortness of breath or palpitations. In the meantime, light activity and lifting precautions per cardiac cath postoperative instructions. Thirty five minutes discharge time.       Amanda Nassar MD      [id number]  D:  08/11/2022 11:21  T:  08/11/2022 13:00  JOB #:  [id number]  CC:  Mulu Perez MD

## 2022-08-11 NOTE — PROGRESS NOTES
Pt refusing 2u Lispro for . Pt states he has never been diagnosed with DM or taken Metformin or Trulicity at home.  PM Hospitalist notified and will inform day team.

## 2022-08-11 NOTE — PROGRESS NOTES
Cardiology Progress Note        Patient: Nati Collins        Sex: male          DOA: 8/6/2022  YOB: 1950      Age:  74 y.o.        LOS:  LOS: 5 days       Patient seen examined, chart reviewed    Assessment/Plan     Patient Active Problem List   Diagnosis Code    Chest pain R07.9    Esophageal reflux K21.9    Hyperlipidemia E78.5    Impaired fasting glucose R73.01    Actinic keratosis L57.0    Polycythemia D75.1    DVT (deep venous thrombosis) ([facility]) I82.409    NSTEMI (non-ST elevated myocardial infarction) ([facility] [address] I21.4    BPH (benign prostatic hyperplasia) N40.0       I reviewed echocardiogram personally   Echocardiogram revealed inferior, basal and mid inferolateral hypokinesis with estimated LVEF 40-45%    EKG done today ST T changes in inferolateral leads     Cardiac catheterization was done yesterday revealed    Left Main   Left main has luminal irregularities. Left main trifurcates in LAD, Ramus and LCx   Left Anterior Descending   LAD has luminal irregularities. Proximal LAD is calcified vessel. D1 is small caliber bifurcating long vessel with proximal and mid 70% stenosis. Ramus Intermedius   Ramus lesion, 95% stenosed. Lesion is the culprit lesion. The lesion is type C, distal to major branch, eccentric and hazy. The lesion was not previously treated. There is severe plaque burden detected. The plaque feature of this lesion is eccentric. Left Circumflex   LCx is non dominant vessel. LCx has luminal irregularities. OM1 is small caliber vessel with ostial-proximal 80% stenosis. Right Coronary Artery   RCA is dominant vessel. RCA has luminal irregularities. Distal RCA has 50% stenosis in the bend. RPDA and RPLA are small caliber vessel with luminal irregularities. Intervention      Ramus lesion   Angioplasty   Angioplasty using a standard balloon was performed prior to stent deployment. The balloon used was a CATH BLLN DIL 2.5 X12MM RX -- EUPHORA.  Lesion complication(s): no complications. Stent   A single stent was placed. Drug-eluting stent was successfully placed. The stent used was a STENT COR ALEXUS [id number] -- ALEXUS RESOLUTE BRITTANY. The strut is well apposed. Angioplasty   Angioplasty using a standard balloon was performed following stent deployment. The balloon used was a CATH MARY BLLN DIL 3.5X12MM -- NC EUPHORA. The second balloon used was a CATHETER BALLOON DIL  CM 3.75X8 MM NADYA LELAND. Lesion complication(s): no complications. Post-Intervention Lesion Assessment   The intervention was successful. The guidewire crossed the lesion. Device was deployed. The pre-interventional distal flow is decreased (JIM 2). Post-intervention JIM flow is 3. There were no complications. There is a 0% residual stenosis post intervention. Left Ventricle LV gram was not done. LVEDP 6 mm hg. No significant gradient on pull back. Plan:    Continue aspirin, Plavix, Metoprolol, atorvastatin and full dose of Continue Ranolazine 500 mg twice a day    Discussed with patient and family member about importance of dual antiplatelet therapy for at least 12 months due to non-STEMI and use of drug-eluting. Okay to change Eliquis to 2.5 mg by mouth twice a day as patient has chronic DVT and he received 6 months of full dose of Eliquis  Advised about diet    No strenuous exercise or strenuous activity for now. No weight lifting no more than 10 lbs from left hand for 5 days. Follow-up in cardiology clinic in 4 weeks. Plan discussed with patient and family member at bedside. Plan discussed with                Subjective:    cc:   Denies any chest pain or unusual shortness of breath  I would like to go home      REVIEW OF SYSTEMS:     General: No fevers or chills. Cardiovascular:  No chest pain,No palpitations, No orthopnea, No PND, No leg swelling, No claudication  Pulmonary: No  dyspnea.    Gastrointestinal: No nausea, vomiting, bleeding  Neurology: No Dizziness    Objective:      Visit Vitals  /74   Pulse 76   Temp 97.7 °F (36.5 °C)   Resp 18   Ht 5' 10\" (1.778 m)   Wt 80.6 kg (177 lb 12.8 oz)   SpO2 96%   BMI 25.51 kg/m²     Body mass index is 25.51 kg/m². Physical Exam:  General Appearance: Comfortable, not using accessory muscles of respiration. HEENT: MATTY. HEAD: Atraumatic  NECK: No JVD, no thyroidomeglay. CAROTIDS: no bruit  LUNGS: Clear bilaterally. HEART: S1+S2 audible, no murmur, no pericardial rub. ABD: Non-tender, BS Audible    EXT: No edema, and no cyanosis. VASCULAR EXAM: Pulses are intact. Left wist: No swelling, no hematoma, no ecchymosis, no tenderness, left radial pulse +, normal motor and sensory exam of left hand     PSYCHIATRIC EXAM: Mood is appropriate. MUSCULOSKELETAL: Grossly no joint deformity.   NEUROLOGICAL: AAO times 3, No motor and sensory deficit    Medication:  Current Facility-Administered Medications   Medication Dose Route Frequency    pantoprazole (PROTONIX) tablet 40 mg  40 mg Oral ACB    ranolazine ER (RANEXA) tablet 500 mg  500 mg Oral BID    aspirin delayed-release tablet 81 mg  81 mg Oral DAILY    clopidogreL (PLAVIX) tablet 75 mg  75 mg Oral DAILY    enoxaparin (LOVENOX) injection 80 mg  1 mg/kg SubCUTAneous Q12H    acetaminophen (TYLENOL) tablet 650 mg  650 mg Oral Q6H PRN    cholecalciferol (VITAMIN D3) (1000 Units /25 mcg) tablet 1,000 Units  1,000 Units Oral DAILY    cyanocobalamin (VITAMIN B12) tablet 500 mcg  500 mcg Oral DAILY    glucose chewable tablet 16 g  4 Tablet Oral PRN    glucagon (GLUCAGEN) injection 1 mg  1 mg IntraMUSCular PRN    dextrose 10% infusion 0-250 mL  0-250 mL IntraVENous PRN    atorvastatin (LIPITOR) tablet 40 mg  40 mg Oral QHS    metoprolol tartrate (LOPRESSOR) tablet 12.5 mg  12.5 mg Oral Q12H    morphine injection 2 mg  2 mg IntraVENous Q4H PRN    sodium chloride (NS) flush 5-40 mL  5-40 mL IntraVENous Q8H    sodium chloride (NS) flush 5-40 mL  5-40 mL IntraVENous PRN magnesium hydroxide (MILK OF MAGNESIA) 400 mg/5 mL oral suspension 30 mL  30 mL Oral DAILY PRN    docusate sodium (COLACE) capsule 100 mg  100 mg Oral BID    sodium chloride (NS) flush 5-40 mL  5-40 mL IntraVENous Q8H    sodium chloride (NS) flush 5-40 mL  5-40 mL IntraVENous PRN    polyethylene glycol (MIRALAX) packet 17 g  17 g Oral DAILY PRN    ondansetron (ZOFRAN ODT) tablet 4 mg  4 mg Oral Q8H PRN    Or    ondansetron (ZOFRAN) injection 4 mg  4 mg IntraVENous Q6H PRN               Lab/Data Reviewed:       Recent Labs     08/11/22  0227 08/10/22  0321 08/09/22  0835 08/09/22  0444   WBC 9.4 7.7  --  7.1   HGB 17.1* 16.6* 16.1* 16.3*   HCT 50.9* 49.7* 47.7 48.3*    152  --  146       Recent Labs     08/11/22  0227 08/10/22  0321 08/09/22  0444    138 137   K 4.8 4.3 4.2    106 105   CO2 29 29 27   * 96 86   BUN 16 12 16   CREA 1.13 1.09 1.12   CA 9.6 9.0 8.9           Signed By: Breezy Walsh MD     August 11, 2022

## 2022-09-12 ENCOUNTER — HOSPITAL ENCOUNTER (OUTPATIENT)
Dept: CARDIAC REHAB | Age: 72
Discharge: HOME | End: 2022-09-12
Payer: MEDICARE

## 2022-09-12 VITALS — BODY MASS INDEX: 24.25 KG/M2 | WEIGHT: 169 LBS

## 2022-09-12 PROCEDURE — 93798 PHYS/QHP OP CAR RHAB W/ECG: CPT

## 2022-09-12 RX ORDER — CHOLECALCIFEROL (VITAMIN D3) 125 MCG
CAPSULE ORAL
COMMUNITY

## 2022-09-12 NOTE — PROGRESS NOTES
CARDIAC REHAB INITIAL ITP FOR REVIEW AND SIGNATURE    Martita Bernardo 74 y.o. presented to cardiac rehab for an intake and a six minute walk test today with a primary diagnosis of NSTEMI/PCI. Patient's EF is 40-45%. Martita Bernardo has a history of Hyperlipidemia. Cardiac risk factors include dyslipidemia and these were reviewed with patient. Martita Bernardo is  and lives with lives with their family. PHQ-9, depression score, is 1 and this is considered to be low. The result was discussed with patient who confirms score to be accurate and if above a 5, a copy of the results were sent to patient's PCP. Patient denied chest pain or SOB during 6 minute walk and the cardiac rhythm was in Normal Sinus Rhythm w/1 degree AV Block    Martita Bernardo will attend exercise and educational sessions 2-3 days a week in cardiac rehab for 36 sessions. Goals for Rehab:    Patient name: Martita Bernardo : 1950         Goals Comments   1. Increase strength and endurance - return to golf       [x] initial  [] met                  [] not met  [] progressing  Walks 3-6 days a week 15-60 min at a time   2. Lose 5-10 lbs by end of program 2.8-4 by 81KHX recert   [x] initial  [] met                  [] not met  [] progressing    3.  Improve Lipid profile by end of program   [x] initial  [] met                  [] not met  [] progressing        Cardiac ITP    Flowsheet Row CR INTAKE from 2022 in [facility]   Treatment Diagnosis    Treatment Diagnosis 1 NSTEMI   NSTEMI Date 22   Treatment Diagnosis 2 PCI   PCI Date 22   Referral Date 22   Individual Treatment Plan    ITP Visit Type Initial Assessment   1st Date of Exercise  22   ITP Next Review Date 10/10/22   Visit #/Total Visits 1   EF % 45 %   Risk Stratification Low   ITP Exercise, Nutrition, Education   Exercise     Stages of Change Action   DASI Total Score 45.45   Assisted Devices None   Total Score --   Test Six minute walk test Exercise Prescription    Mode Treadmill, Bike, Ergometer, Other (comment)   Frequency per week 2-3   Duration per session 35-55   RPE 11-13   Symptoms with Exercise Other (comment)  [denies]   Target Heart Rate    Resistance Training Yes   Exercise Blood Pressures    Resting /74   Peak /74   Is BP WDL?  Yes   Exercise Activity at Home    Type WALK, GOLF   Frequency ALMOST DAILY walk golf occasionlly   Duration 12-60MIN   Exercise Education    Education Self pulse, Exercise safety, Signs/Symptoms to report, RPE scale, Equipment orientation, Warm up/Cool down, Physically active   Exercise Target Goal    Target Goal(s) Individual exercise RX, Aerobic activity 30 + minutes/day 5 days/week, BP < 140/90 or < 130/80, if DM or CKD   Psychosocial    [facility] Total Score 15   PHQ 9 Score 1   Psychosocial Intervention    Psychosocial Education    Psychosocial Target Goals    Nutrition    Diabetes No   Lipids    Date Lipids Drawn 08/07/22   Total 239   HDL 45      Triglycerides 56   Lipid Med(s) LIPITOR   Lipid Med Change(s) No   Weight Management    Weight  169 lb     Height  5' 10\" (1.778 m)   BMI 23.58   Weight Goal 160 LB   Alcohol Daily   Type WINE   Rate Your Plate Total Score 51   Nutrition Intervention    Nutrition Education    Education Healthy eating   Nutrition Target Goals    Target Goals LDL-C less than 70 for high risk, BMI less than 25, LDL-C less than 100   Education    Learning Barrier Ready to learn   Education Intervention    Education Schedule Given Yes   Patient Education     Hypertension No   Education Target Goals    Target Goals Risk factors, Medication compliance, Understand target guidelines for lipids   Physician Response      Exercise    Flowsheet Row CR INTAKE from 9/12/2022 in [facility] Common Questions    Any problems changes since your last visit Other (comment)  [initial intake]   Any symptoms while exercising denies   Psychosocial/Stress Level 0 Resting EKG rhythm SR w/1st degree AV block   Tobacco Use None   ITP Next Review Date 10/10/22   Visit Number/Total Visits 1/36   What is plan for next session start program   On Call Medical Director Immediately Available [facility]   Exercise Treatment Log    Target Heart Rate(Range)    Resting HR 79   Resting /74   Recovery HR 72   Recovery /70   Weight 76.7 kg (169 lb)   Exercise EKG Rhythm SR w 1degree block   Exercise Duration 6mim   Peak HR 96   Peak /74   Peak RPE 10   Peak Mets 3.32   SOB denies   Angina denies   Claudulation denies   Asymptomatic yes   O2 Saturation 98   Total Minutes [address] Kalin Lerma RN 9/12/2022 2:26 PM

## 2022-09-14 ENCOUNTER — HOSPITAL ENCOUNTER (OUTPATIENT)
Dept: CARDIAC REHAB | Age: 72
Discharge: HOME | End: 2022-09-14
Payer: MEDICARE

## 2022-09-14 VITALS — BODY MASS INDEX: 24.11 KG/M2 | WEIGHT: 168 LBS

## 2022-09-14 PROCEDURE — 93798 PHYS/QHP OP CAR RHAB W/ECG: CPT

## 2022-09-19 ENCOUNTER — HOSPITAL ENCOUNTER (OUTPATIENT)
Dept: CARDIAC REHAB | Age: 72
Discharge: HOME | End: 2022-09-19
Payer: MEDICARE

## 2022-09-19 VITALS — WEIGHT: 168 LBS | BODY MASS INDEX: 24.11 KG/M2

## 2022-09-19 PROCEDURE — 93798 PHYS/QHP OP CAR RHAB W/ECG: CPT

## 2022-09-21 ENCOUNTER — HOSPITAL ENCOUNTER (OUTPATIENT)
Dept: CARDIAC REHAB | Age: 72
Discharge: HOME | End: 2022-09-21
Payer: MEDICARE

## 2022-09-21 VITALS — BODY MASS INDEX: 24.25 KG/M2 | WEIGHT: 169 LBS

## 2022-09-21 PROCEDURE — 93798 PHYS/QHP OP CAR RHAB W/ECG: CPT

## 2022-09-23 ENCOUNTER — HOSPITAL ENCOUNTER (OUTPATIENT)
Dept: CARDIAC REHAB | Age: 72
Discharge: HOME | End: 2022-09-23
Payer: MEDICARE

## 2022-09-23 VITALS — WEIGHT: 168 LBS | BODY MASS INDEX: 24.11 KG/M2

## 2022-09-23 PROCEDURE — 93798 PHYS/QHP OP CAR RHAB W/ECG: CPT

## 2022-09-26 ENCOUNTER — APPOINTMENT (OUTPATIENT)
Dept: CARDIAC REHAB | Age: 72
End: 2022-09-26
Payer: MEDICARE

## 2022-09-28 ENCOUNTER — HOSPITAL ENCOUNTER (OUTPATIENT)
Dept: CARDIAC REHAB | Age: 72
End: 2022-09-28
Payer: MEDICARE

## 2022-09-28 NOTE — PROGRESS NOTES
Non Routine Cardiac Rehab Discharge    Jacob Castaneda  74 y.o. With diagnosis of NSTEMI attended phase II cardiac rehab for 5 sessions, 9/12/2022 through 9/23/2022. Pt was advised that wearing mask policy was reinstated in facility. Nurse attempted to discuss and educate pt when he refused to wear mask or face shield and stated, \" I didn't have a vaccine shot so I don't have to wear a mask\". Follow up call was made by [facility] with further discussion. Pt states he would come back if mask policy was removed.  Pt  advised If he were to return he would need an new referral.    Cain Hernandez RN  9/28/2022

## 2022-09-30 ENCOUNTER — HOSPITAL ENCOUNTER (OUTPATIENT)
Dept: CARDIAC REHAB | Age: 72
Discharge: HOME | End: 2022-09-30
Payer: MEDICARE

## 2022-09-30 ENCOUNTER — APPOINTMENT (OUTPATIENT)
Dept: CARDIAC REHAB | Age: 72
End: 2022-09-30
Payer: MEDICARE

## 2022-09-30 VITALS — WEIGHT: 168 LBS | BODY MASS INDEX: 24.11 KG/M2

## 2022-09-30 PROCEDURE — 93798 PHYS/QHP OP CAR RHAB W/ECG: CPT

## 2022-09-30 NOTE — PROGRESS NOTES
CARDIAC REHAB INITIAL ITP FOR REVIEW AND SIGNATURE    Ese Weinberg 74 y.o. presented to cardiac rehab for an intake and a six minute walk test today with a primary diagnosis of PTCA w/ stent. Patient's EF is 40%. Ese Weinberg has a history of DVT, Hyperlipidemia, and impaired fasting glucose. Cardiac risk factors include none and these were reviewed with patient. Ese Weinberg is  and lives with lives with their spouse. PHQ-9, depression score, is 1 and this is considered to be low. The result was discussed with patient who confirms score to be accurate and if above a 5, a copy of the results were sent to patient's PCP. Patient denied chest pain or SOB during 6 minute walk and the cardiac rhythm was in Normal Sinus Rhythm w/ 1st degree block. Ese Weinberg will attend exercise and educational sessions 3 days a week in cardiac rehab for 36 sessions. Exceptions noted during intake include none. Pt restarted cardiac rehab program. He was discharged 2 days ago. Goals for Rehab:    Patient name: Ese Weinberg : 1950         Goals Comments   1. Increase stamina and endurance by the end of next recert. [x] initial  [] met                  [] not met  [] progressing  Pt will increase resistance to maximize cardiac output. 2. Watch one nutritional video by the end of next recert. [x] initial  [] met                  [] not met  [] progressing Pt will apply knowledge of healthy eating to diet . 3. Exercise for 45 minutes per session by the end of next recert. [x] initial  [] met                  [] not met  [] progressing Pt will exercise for the full 45 minutes of work outs for cardiac benefits. 4. Exercise while staying within target heart rate of . [x] initial  [] met                  [] not met  [] progressing Pt will stay in THR to prevent straining of heart muscle.         22 1000   Treatment Diagnosis   Treatment Diagnosis 1   (PTCA w/stent)   OXYGEN INTERVENTION   Oxygen Use No   Individual Treatment Plan   ITP Visit Type Initial Assessment   1st Date of Exercise  09/30/22   ITP Next Review Date 10/24/22   Visit #/Total Visits 1/36   EF % 40 %   Risk Stratification High   ITP Exercise;Psychosocial;Tobacco;Education;Nutrition   Exercise    DASI Total Score 45.45   Stages of Change Preparation   Test Six minute walk test   Assisted Devices None   Data Measured Before Walk   Heart Rate 56   Blood Pressure 122/72   O2 Saturation 98   O2 Device Room air   RPD 0   RPE 6   Data Measured during Walk   RPE Data Measured During   Treadmill Speed 3 mph   Any problems while exercising none   Do you have shortness of breath No   Describe type of pain you are having none   Peak RPE 9   Peak RPD 0   Data Measured Immediately After Walk   Distance Walked (ft) 0.3 ft   Heart Rate 60   Blood Pressure 134/74   Modified Burt Scale 0   RPE 9   O2 Saturation 98   Data Measured at 5 Minutes After Walk   Heart Rate 57   Blood Pressure 129/73   SpO2 98 %   Exercise Prescription   Mode Treadmill;Bike;Elliptical;Ergometer   Frequency per week 2-3   Duration per session 31-45   Intensity  METS       3.32   RPE 11-13   Progression   (inital)   Target Heart Rate    Resistance Training Yes   Exercise Blood Pressures   Resting /72   Peak /73   Is BP WDL? Yes   Exercise Activity at Home   Type walking   Frequency daily   Duration 45   Resistance Training No   Exercise Education   Education Self pulse;Exercise safety;Signs/Symptoms to report;Physically active;RPE scale   Exercise Target Goal   Target Goal(s) Individual exercise RX;BP < 140/90 or < 130/80, if DM or CKD; Aerobic activity 30 + minutes/day  5 days/week   Patient Stated Exercise Goals Increase stamina and endurance   Psychosocial   Stages of Change Preparation   [facility] Total Score 15   PHQ 9 Score 1   Psychosocial Intervention   Interventions No intervention indicated   Psychosocial Target Goals   Target Goal(s) Assess presence or absence of depression using a valid screening tool   Tobacco   Tobacco Use No   Nutrition   Stages of Change Preparation   Diabetes No   Lipids   Date Lipids Drawn 08/07/22   Total 239   HDL 45   .8   Triglycerides 56   Lipid Med(s) lipitor   Lipid Med Change(s) No   Weight Management   Rate Your Plate Total Score 51   Waist Circumference  38   Alcohol None   Weight  76.2 kg (168 lb)   Height  5' 10\" (1.778 m)   BMI 24.16   Nutrition Intervention   Dietitian Consult No   Nurse/Patient Discussion Yes   Nutrition Class No   Diabetes Education Referral No   Lipid Clinic Referral No   Weight Management Referral No   Nutrition Education   Education Low fat & cholesterol diet;Special diet; Healthy eating; Low sodium diet;Carb-controlled diet   Nutrition Target Goals   Target Goals BMI less than 25;LDL-C less than 70 for high risk;LDL-C 70 high risk   Education Intervention   Education Schedule Given Yes   Patient Education    Education CAD;Risk factors;Signs/Symptoms of Angina;Cardiac A&P; Med Compliance   Hypertension No   Hypertension Controlled Yes   Is BP WDL?  Yes   Med(s) Change No   BP Meds lopressor   ACE/ARB Prescribed No   ASA Prescribed Yes   BB Prescribed Yes   Statins Prescribed Yes   ASA Adherent Yes   BB Adherent Yes   Statin Intensity High   Statins Adherent Yes   Treatment Goals   Goals Exercise        09/30/22 1012   Rehab Common Questions   Any problems changes since your last visit Denies   Any symptoms while exercising denies   Psychosocial/Stress Level 0   Resting EKG rhythm SR 1st degree Block   Tobacco Use None   ITP Next Review Date 10/24/22   Visit Number/Total Visits 1/36   What is plan for next session Exercise   On Call Medical Director Immediately Available [facility]   Exercise Treatment Log   Target Heart Rate(Range)    Resting HR 56   Resting /72   Recovery HR 57   Recovery /73   Weight 76.2 kg (168 lb)   Exercise EKG Rhythm SR 1st degree block   Exercise Duration 6   Peak HR 91   Peak /74   Peak RPE 9   SOB 0   Angina 0   Claudulation 0   Asymptomatic yes   Total Minutes 16          09/30/22 1000   Special Test   Stage of Rehab Intake   Test Regino;DASI;PHQ 9;Rate Your Plate   [facility] Activity Status Index   Can you take care of yourself? 2.75   Can you walk indoors? 1.75   Can you walk a block or two on level ground? 2.75   Can you climb a flight of stairs or walk up a hill? 5.50   Can you run a short distance? 8.00   Can you do light work around the house? 2.70   Can you do moderate work around the house? 3.50   Can you do heavy work around the house? 8.00   Can you do yardwork? 4.50   Can you have sexual relation? 0   Can you participate in moderate reactional activities?  6.00   Can you participate in strenuous sports? 0   DASI Total Score 45.45   Estimated Peak Oxygen Uptake 8.33 mL/min   [facility]   Feelings 3   Physical Fitness 3   Social Support 2   Daily Activities 1   Social Activities 1   Pain 1   Overall Health 1   Quality of Life 2   Change in Health 1   [facility] Total Score 15   Rate Your Plate    Meat Cuts 2   Chicken or [place] 3   Ground Meat and Poultry 3   Processed Meat and Poultry 2   Portion Size of Meat and Poultry 2   Fish or Shellfish 1   Cooking Method 3   Meatless Meals 1   Whole Eggs 1   Milk 2   Cheese 2   Dairy Foods 2   Whole Grains 2   Fruits and Vegetables 2   Cooking Method 2   Fat Type in Cooking 3   Salt From Processed Foods 3   Spreads 1   Salad Dressings or Mayonnaise 1   Snack Foods 3   Nuts or Seeds 3   Frozen Desserts 2   Sweets or Pastries or Candy 2   Eating Out 3   Rate Your Plate Total Score 51   PHQ 2 / 9  *If PHQ 2 score is 3 or more, remaining PHQ 9 questions will open*   Little interest or pleasure in doing things 0   Feeling down, depressed, irritable, or hopeless 1   Total Score PHQ 2 1   Trouble falling or staying asleep, or sleeping too much 0   Feeling tired or having little energy 0   Poor appetite, weight loss, or overeating 0   Feeling bad about yourself - or that you are a failure or have let yourself or your family down 0   Trouble concentrating on things such as school, work, reading, or watching TV 0   Moving or speaking so slowly that other people could have noticed; or the opposite being so fidgety that others notice 0   Thoughts of being better off dead, or hurting yourself in some way 0   PHQ 9 Score 1   How difficult have these problems made it for you to do your work, take care of your home and get along with others Not difficult at all               Collette Stare, RN 9/30/2022 11:21 AM

## 2022-10-03 ENCOUNTER — HOSPITAL ENCOUNTER (OUTPATIENT)
Dept: CARDIAC REHAB | Age: 72
Discharge: HOME | End: 2022-10-03
Payer: MEDICARE

## 2022-10-03 ENCOUNTER — APPOINTMENT (OUTPATIENT)
Dept: CARDIAC REHAB | Age: 72
End: 2022-10-03

## 2022-10-03 VITALS — BODY MASS INDEX: 24.11 KG/M2 | WEIGHT: 168 LBS

## 2022-10-03 PROCEDURE — 93798 PHYS/QHP OP CAR RHAB W/ECG: CPT

## 2022-10-05 ENCOUNTER — HOSPITAL ENCOUNTER (OUTPATIENT)
Dept: CARDIAC REHAB | Age: 72
Discharge: HOME | End: 2022-10-05
Payer: MEDICARE

## 2022-10-05 ENCOUNTER — APPOINTMENT (OUTPATIENT)
Dept: CARDIAC REHAB | Age: 72
End: 2022-10-05

## 2022-10-05 VITALS — BODY MASS INDEX: 23.96 KG/M2 | WEIGHT: 167 LBS

## 2022-10-05 PROCEDURE — 93798 PHYS/QHP OP CAR RHAB W/ECG: CPT

## 2022-10-07 ENCOUNTER — APPOINTMENT (OUTPATIENT)
Dept: CARDIAC REHAB | Age: 72
End: 2022-10-07

## 2022-10-07 ENCOUNTER — HOSPITAL ENCOUNTER (OUTPATIENT)
Dept: CARDIAC REHAB | Age: 72
Discharge: HOME | End: 2022-10-07
Payer: MEDICARE

## 2022-10-07 VITALS — WEIGHT: 167 LBS | BODY MASS INDEX: 23.96 KG/M2

## 2022-10-07 PROCEDURE — 93798 PHYS/QHP OP CAR RHAB W/ECG: CPT

## 2022-10-10 ENCOUNTER — HOSPITAL ENCOUNTER (OUTPATIENT)
Dept: CARDIAC REHAB | Age: 72
Discharge: HOME | End: 2022-10-10
Payer: MEDICARE

## 2022-10-10 ENCOUNTER — APPOINTMENT (OUTPATIENT)
Dept: CARDIAC REHAB | Age: 72
End: 2022-10-10

## 2022-10-10 VITALS — WEIGHT: 166 LBS | BODY MASS INDEX: 23.82 KG/M2

## 2022-10-10 PROCEDURE — 93798 PHYS/QHP OP CAR RHAB W/ECG: CPT

## 2022-10-12 ENCOUNTER — APPOINTMENT (OUTPATIENT)
Dept: CARDIAC REHAB | Age: 72
End: 2022-10-12

## 2022-10-12 ENCOUNTER — HOSPITAL ENCOUNTER (OUTPATIENT)
Dept: CARDIAC REHAB | Age: 72
Discharge: HOME | End: 2022-10-12
Payer: MEDICARE

## 2022-10-12 VITALS — WEIGHT: 168 LBS | BODY MASS INDEX: 24.11 KG/M2

## 2022-10-12 PROCEDURE — 93798 PHYS/QHP OP CAR RHAB W/ECG: CPT

## 2022-10-14 ENCOUNTER — APPOINTMENT (OUTPATIENT)
Dept: CARDIAC REHAB | Age: 72
End: 2022-10-14

## 2022-10-14 ENCOUNTER — HOSPITAL ENCOUNTER (OUTPATIENT)
Dept: CARDIAC REHAB | Age: 72
Discharge: HOME | End: 2022-10-14
Payer: MEDICARE

## 2022-10-14 VITALS — WEIGHT: 168 LBS | BODY MASS INDEX: 24.11 KG/M2

## 2022-10-14 PROCEDURE — 93798 PHYS/QHP OP CAR RHAB W/ECG: CPT

## 2022-10-17 ENCOUNTER — HOSPITAL ENCOUNTER (OUTPATIENT)
Dept: CARDIAC REHAB | Age: 72
Discharge: HOME | End: 2022-10-17
Payer: MEDICARE

## 2022-10-17 ENCOUNTER — APPOINTMENT (OUTPATIENT)
Dept: CARDIAC REHAB | Age: 72
End: 2022-10-17

## 2022-10-17 VITALS — WEIGHT: 167 LBS | BODY MASS INDEX: 23.96 KG/M2

## 2022-10-17 PROCEDURE — 93798 PHYS/QHP OP CAR RHAB W/ECG: CPT

## 2022-10-19 ENCOUNTER — APPOINTMENT (OUTPATIENT)
Dept: CARDIAC REHAB | Age: 72
End: 2022-10-19

## 2022-10-19 ENCOUNTER — HOSPITAL ENCOUNTER (OUTPATIENT)
Dept: CARDIAC REHAB | Age: 72
Discharge: HOME | End: 2022-10-19
Payer: MEDICARE

## 2022-10-19 VITALS — BODY MASS INDEX: 24.11 KG/M2 | WEIGHT: 168 LBS

## 2022-10-19 PROCEDURE — 93798 PHYS/QHP OP CAR RHAB W/ECG: CPT

## 2022-10-21 ENCOUNTER — HOSPITAL ENCOUNTER (OUTPATIENT)
Dept: CARDIAC REHAB | Age: 72
Discharge: HOME | End: 2022-10-21
Payer: MEDICARE

## 2022-10-21 ENCOUNTER — APPOINTMENT (OUTPATIENT)
Dept: CARDIAC REHAB | Age: 72
End: 2022-10-21

## 2022-10-21 VITALS — BODY MASS INDEX: 24.11 KG/M2 | WEIGHT: 168 LBS

## 2022-10-21 PROCEDURE — 93798 PHYS/QHP OP CAR RHAB W/ECG: CPT

## 2022-10-24 ENCOUNTER — APPOINTMENT (OUTPATIENT)
Dept: CARDIAC REHAB | Age: 72
End: 2022-10-24

## 2022-10-24 ENCOUNTER — HOSPITAL ENCOUNTER (OUTPATIENT)
Dept: CARDIAC REHAB | Age: 72
Discharge: HOME | End: 2022-10-24
Payer: MEDICARE

## 2022-10-24 VITALS — WEIGHT: 169 LBS | BODY MASS INDEX: 24.25 KG/M2

## 2022-10-24 PROCEDURE — 93798 PHYS/QHP OP CAR RHAB W/ECG: CPT

## 2022-10-24 NOTE — PROGRESS NOTES
CARDIAC REHAB ITP REASSESSMENT FOR REVIEW AND SIGNATURE  Patient name: David Valverde : 1950     Visits from Start of Care:       Reporting Period: 2022 to 10/24/2022    Subjective Reports: Pt is progressing well, no complaints. Goals Comments   1. Increase stamina and endurance by the end of next recert. [] met                  [] not met  [x] progressing  Pt will increase resistance to maximize cardiac benefits. 2. Watch one nutritional video by the end of next recert. [] met                  [] not met  [x] progressing Pt will apply knowledge from video to diet. 3. Maintain Target Heart Rate during exercises by the end of next recert. [x] met                  [] not met  [] progressing Pt exercises while staying within [facility] during each session. 4. Utilize three different machines per session by the end of next recert. [] met                  [] not met  [x] progressing Pt will utilities three machines to increase cardiac perfusion. Key functional changes: Pt has increased resistance during exercises. Pt is energized prior and after exercises. Problems/ barriers to goal attainment: None. Assessment / Recommendations:Continue w/ rehab 3x week. 10/24/22 1424   Treatment Diagnosis   Treatment Diagnosis 1   (PTCA w/stent)   Referral Date 22   OXYGEN INTERVENTION   Oxygen Use No   Individual Treatment Plan   ITP Visit Type Re-Assessment   1st Date of Exercise  22   ITP Next Review Date 22   Visit #/Total Visits    EF % 40 %   Risk Stratification High   ITP Exercise;Psychosocial;Tobacco;Education;Nutrition   Exercise    Stages of Change Action   Assisted Devices None   Exercise Prescription   Mode Treadmill;Bike;Elliptical;Ergometer; Stepper   Frequency per week 2-3   Duration per session 31-45   Intensity  METS       3.32   RPE 11-13   Target Heart Rate    Resistance Training Yes   Exercise Blood Pressures   Resting /68   Peak BP 118/68   Is BP WDL? Yes   Exercise Activity at Home   Type walking   Frequency daily   Duration 45   Resistance Training No   Exercise Education   Education Self pulse;Exercise safety;Signs/Symptoms to report;Physically active;RPE scale   Exercise Target Goal   Target Goal(s) Individual exercise RX;BP < 140/90 or < 130/80, if DM or CKD; Aerobic activity 30 + minutes/day  5 days/week   Patient Stated Exercise Goals Increase stamina and endurance   Psychosocial Intervention   Interventions No intervention indicated   Psychosocial Target Goals   Target Goal(s) Assess presence or absence of depression using a valid screening tool   Tobacco   Tobacco Use No   Nutrition   Stages of Change Action   Diabetes No   Lipids   Date Lipids Drawn 08/07/22   Total 239   HDL 45   .8   Triglycerides 56   Lipid Med(s) lipitor   Lipid Med Change(s) No   Weight Management   Waist Circumference  38   Alcohol None   Weight  76.7 kg (169 lb)   Height  5' 10\" (1.778 m)   BMI 24.3   Nutrition Intervention   Dietitian Consult No   Nurse/Patient Discussion Yes   Nutrition Class No   Diabetes Education Referral No   Lipid Clinic Referral No   Weight Management Referral No   Nutrition Education   Education Carb-controlled diet; Low sodium diet; Healthy eating;Special diet; Low fat & cholesterol diet   Nutrition Target Goals   Target Goals BMI less than 25;LDL-C less than 70 for high risk;LDL-C 70 high risk   Education   Learning Barrier Ready to learn   Education Intervention   Education Schedule Given Yes   Patient Education    Education CAD;Risk factors;Signs/Symptoms of Angina;Cardiac A&P; Med Compliance   Hypertension No   Hypertension Controlled Yes   Is BP WDL?  Yes   Med(s) Change No   BP Meds lopressor   ACE/ARB Prescribed No   ASA Prescribed Yes   BB Prescribed Yes   Statins Prescribed Yes   ASA Adherent Yes   BB Adherent Yes   Statin Intensity High   Statins Adherent Yes   Education Target Goals   Target Goals Medication compliance; Understand target guidelines for B/P;Risk factors; Understand target guidelines for lipids   Treatment Goals   Goals Exercise;Blood pressure;Nutrients;Lipids;Weight management;Medications;Education   Exercise Goal Get back on the [employer] course   Exercise Goal Status Progressing        10/24/22 1227   Rehab Common Questions   Any problems changes since your last visit Denies   Any symptoms while exercising Denies   Psychosocial/Stress Level 0   Resting EKG rhythm SR w/ 1st degree block   Tobacco Use None   ITP Next Review Date 11/21/22   Visit Number/Total Visits 11/36   What is plan for next session Exercise   On Call Medical Director Immediately Available [facility]   Exercise Treatment Log   Target Heart Rate(Range)    Resting HR 61   Resting /68   Recovery HR 63   Recovery /66   Weight 76.7 kg (169 lb)   Exercise EKG Rhythm SR/ST w/ 1st degree block   Exercise Duration 40   Peak    Peak /68   Peak RPE 12   SOB 0   Angina 0   Claudulation 0   Asymptomatic Yes   Total Minutes 50       Melida Khalil RN 10/24/2022 2:08 PM left normal/right normal

## 2022-10-26 ENCOUNTER — HOSPITAL ENCOUNTER (OUTPATIENT)
Dept: CARDIAC REHAB | Age: 72
Discharge: HOME | End: 2022-10-26
Payer: MEDICARE

## 2022-10-26 ENCOUNTER — APPOINTMENT (OUTPATIENT)
Dept: CARDIAC REHAB | Age: 72
End: 2022-10-26

## 2022-10-26 VITALS — WEIGHT: 170 LBS | BODY MASS INDEX: 24.39 KG/M2

## 2022-10-26 PROCEDURE — 93798 PHYS/QHP OP CAR RHAB W/ECG: CPT

## 2022-10-28 ENCOUNTER — APPOINTMENT (OUTPATIENT)
Dept: CARDIAC REHAB | Age: 72
End: 2022-10-28

## 2022-10-28 ENCOUNTER — HOSPITAL ENCOUNTER (OUTPATIENT)
Dept: CARDIAC REHAB | Age: 72
Discharge: HOME | End: 2022-10-28
Payer: MEDICARE

## 2022-10-28 VITALS — BODY MASS INDEX: 24.11 KG/M2 | WEIGHT: 168 LBS

## 2022-10-28 PROCEDURE — 93798 PHYS/QHP OP CAR RHAB W/ECG: CPT

## 2022-10-31 ENCOUNTER — APPOINTMENT (OUTPATIENT)
Dept: CARDIAC REHAB | Age: 72
End: 2022-10-31

## 2022-10-31 ENCOUNTER — HOSPITAL ENCOUNTER (OUTPATIENT)
Dept: CARDIAC REHAB | Age: 72
Discharge: HOME | End: 2022-10-31
Payer: MEDICARE

## 2022-10-31 VITALS — BODY MASS INDEX: 24.11 KG/M2 | WEIGHT: 168 LBS

## 2022-10-31 PROCEDURE — 93798 PHYS/QHP OP CAR RHAB W/ECG: CPT

## 2022-11-02 ENCOUNTER — HOSPITAL ENCOUNTER (OUTPATIENT)
Dept: CARDIAC REHAB | Age: 72
Discharge: HOME | End: 2022-11-02
Payer: MEDICARE

## 2022-11-02 VITALS — WEIGHT: 168 LBS | BODY MASS INDEX: 24.11 KG/M2

## 2022-11-02 PROCEDURE — 93798 PHYS/QHP OP CAR RHAB W/ECG: CPT

## 2022-11-04 ENCOUNTER — HOSPITAL ENCOUNTER (OUTPATIENT)
Dept: CARDIAC REHAB | Age: 72
Discharge: HOME | End: 2022-11-04
Payer: MEDICARE

## 2022-11-04 VITALS — WEIGHT: 168 LBS | BODY MASS INDEX: 24.11 KG/M2

## 2022-11-04 PROCEDURE — 93798 PHYS/QHP OP CAR RHAB W/ECG: CPT

## 2022-11-07 ENCOUNTER — HOSPITAL ENCOUNTER (OUTPATIENT)
Dept: CARDIAC REHAB | Age: 72
Discharge: HOME | End: 2022-11-07
Payer: MEDICARE

## 2022-11-07 VITALS — BODY MASS INDEX: 23.96 KG/M2 | WEIGHT: 167 LBS

## 2022-11-07 PROCEDURE — 93798 PHYS/QHP OP CAR RHAB W/ECG: CPT

## 2022-11-09 ENCOUNTER — HOSPITAL ENCOUNTER (OUTPATIENT)
Dept: CARDIAC REHAB | Age: 72
Discharge: HOME | End: 2022-11-09
Payer: MEDICARE

## 2022-11-09 VITALS — WEIGHT: 169 LBS | BODY MASS INDEX: 24.25 KG/M2

## 2022-11-09 PROCEDURE — 93798 PHYS/QHP OP CAR RHAB W/ECG: CPT

## 2022-11-11 ENCOUNTER — HOSPITAL ENCOUNTER (OUTPATIENT)
Dept: CARDIAC REHAB | Age: 72
Discharge: HOME | End: 2022-11-11
Payer: MEDICARE

## 2022-11-11 VITALS — BODY MASS INDEX: 23.96 KG/M2 | WEIGHT: 167 LBS

## 2022-11-11 PROCEDURE — 93798 PHYS/QHP OP CAR RHAB W/ECG: CPT

## 2022-11-14 ENCOUNTER — HOSPITAL ENCOUNTER (OUTPATIENT)
Dept: CARDIAC REHAB | Age: 72
Discharge: HOME | End: 2022-11-14
Payer: MEDICARE

## 2022-11-14 VITALS — WEIGHT: 169 LBS | BODY MASS INDEX: 24.25 KG/M2

## 2022-11-14 PROCEDURE — 93798 PHYS/QHP OP CAR RHAB W/ECG: CPT

## 2022-11-16 ENCOUNTER — HOSPITAL ENCOUNTER (OUTPATIENT)
Dept: CARDIAC REHAB | Age: 72
Discharge: HOME | End: 2022-11-16
Payer: MEDICARE

## 2022-11-16 VITALS — BODY MASS INDEX: 24.25 KG/M2 | WEIGHT: 169 LBS

## 2022-11-16 PROCEDURE — 93798 PHYS/QHP OP CAR RHAB W/ECG: CPT

## 2022-11-18 ENCOUNTER — HOSPITAL ENCOUNTER (OUTPATIENT)
Dept: CARDIAC REHAB | Age: 72
Discharge: HOME | End: 2022-11-18
Payer: MEDICARE

## 2022-11-18 VITALS — WEIGHT: 168 LBS | BODY MASS INDEX: 24.11 KG/M2

## 2022-11-18 PROCEDURE — 93798 PHYS/QHP OP CAR RHAB W/ECG: CPT

## 2022-11-21 ENCOUNTER — HOSPITAL ENCOUNTER (OUTPATIENT)
Dept: CARDIAC REHAB | Age: 72
Discharge: HOME | End: 2022-11-21
Payer: MEDICARE

## 2022-11-21 VITALS — WEIGHT: 169 LBS | BODY MASS INDEX: 24.25 KG/M2

## 2022-11-21 PROCEDURE — 93798 PHYS/QHP OP CAR RHAB W/ECG: CPT

## 2022-11-21 NOTE — PROGRESS NOTES
CARDIAC REHAB ITP REASSESSMENT FOR REVIEW AND SIGNATURE  Patient name: Sunil Teran : 1950     Visits from Start of Care:       Reporting Period: 10/24/2022 to 2022    Subjective Reports: Pt is progressing well, no complaints. Goals Comments   1. Utilize three different machines per session. [x] met                  [] not met  [] progressing  Pt utilized Schwinn bike to increase cardiac perfusion. 2. Decrease stress level by the end of next recert. [] met                  [] not met  [x] progressing Pt stated increased stress levels on multiple sessions. Pt will decrease stress levels to maximize cardiac benefits. 3. Increase speed and stamina by the end of next recert. [] met                  [] not met  [x] progressing Pt will increase cardiac output for optimum cardiac perfusion. 4. Get back on the golf course by the end of cardiac rehab. [] met                  [] not met  [x] progressing Pt states his long term golf is to play golf again. Key functional changes: Mr. Zoe Mcmillans has increased speed and resistance on treadmill and recumbent bike. Pt has increased rehab sessions to 45-49 minutes. Pt has a plan to exercise on random settings on recumbent bike with increasing 2 minutes on machine each visit. Pt incorporated Schwinn bike in exercise. Problems/ barriers to goal attainment: None     Assessment / Recommendations:Continue w/ rehab.       22 1254   Treatment Diagnosis   Treatment Diagnosis 1 PCI  (PTCA w/stent)   Referral Date 22   OXYGEN INTERVENTION   Oxygen Use No   Individual Treatment Plan   ITP Visit Type Re-Assessment   1st Date of Exercise  22   ITP Next Review Date 22   Visit #/Total Visits    EF % 40 %   Risk Stratification High   ITP Exercise;Psychosocial;Tobacco;Education;Nutrition   Exercise    Stages of Change Action   Assisted Devices None   Exercise Prescription   Mode Treadmill;Bike;Elliptical;Ergometer; Stepper Frequency per week 2-3   Duration per session 31-45   Intensity  METS       3.32   RPE 11-13   Progression Progressing   Symptoms with Exercise   (none)   Target Heart Rate    Resistance Training Yes   Exercise Blood Pressures   Resting /68   Peak /68   Is BP WDL? Yes   Exercise Activity at Home   Type walking   Frequency daily   Duration 45   Resistance Training No   Exercise Education   Education Self pulse;Exercise safety;Signs/Symptoms to report;Physically active;RPE scale   Exercise Target Goal   Target Goal(s) Individual exercise RX;BP < 140/90 or < 130/80, if DM or CKD; Aerobic activity 30 + minutes/day  5 days/week   Patient Stated Exercise Goals Increase stamina and endurance   Psychosocial   Stages of Change Action   Psychosocial Intervention   Interventions No intervention indicated   Currently Taking Psychotropic Meds No   Medication Changes No   Psychosocial Target Goals   Target Goal(s) Assess presence or absence of depression using a valid screening tool   Tobacco   Tobacco Use No   Nutrition   Stages of Change Action   Diabetes No   Lipids   Date Lipids Drawn 08/07/22   Total 239   HDL 45   .8   Triglycerides 56   Lipid Med(s) lipitor   Lipid Med Change(s) No   Weight Management   Waist Circumference  38   Alcohol None   Weight  76.7 kg (169 lb)   Height  5' 10\" (1.778 m)   BMI 24.3   Nutrition Intervention   Dietitian Consult No   Nurse/Patient Discussion Yes   Nutrition Class No   Diabetes Education Referral No   Lipid Clinic Referral No   Weight Management Referral No   Nutrition Education   Education Carb-controlled diet; Low sodium diet; Healthy eating; Low fat & cholesterol diet   Nutrition Target Goals   Target Goals BMI less than 25;LDL-C less than 70 for high risk;LDL-C 70 high risk   Education   Learning Barrier Ready to learn   Education Intervention   Education Schedule Given Yes   Patient Education    Education CAD;Risk factors;Signs/Symptoms of Angina;Cardiac A&P;Med Compliance   Hypertension Yes   Hypertension Controlled Yes   Is BP WDL? Yes   Med(s) Change No   BP Meds lopressor   ACE/ARB Prescribed No   ASA Prescribed Yes   BB Prescribed Yes   Statins Prescribed Yes   ASA Adherent Yes   BB Adherent Yes   Statin Intensity High   Statins Adherent Yes   Education Target Goals   Target Goals Medication compliance; Understand target guidelines for B/P;Risk factors; Understand target guidelines for lipids   Treatment Goals   Goals Exercise;Blood pressure;Nutrients;Lipids;Weight management;Medications;Education   Exercise Goal Get back on the [employer] course   Exercise Goal Status Progressing        11/21/22 1055   Rehab Common Questions   Any problems changes since your last visit Denies   Any symptoms while exercising Denies   Psychosocial/Stress Level 0   Resting EKG rhythm SR w/ 1st degree block   Tobacco Use None   ITP Next Review Date 11/21/22   Visit Number/Total Visits 23/36   What is plan for next session Exercise   On Call Medical Director Immediately Available [facility]   Exercise Treatment Log   Target Heart Rate(Range)    Resting HR 71   Resting /68   Recovery HR 91   Recovery BP 96/60   Weight 76.7 kg (169 lb)   Exercise EKG Rhythm SR/ST w/ 1st degree block   Exercise Duration 49   Peak    Peak /68   Peak RPE 14   SOB 0   Angina 0   Claudulation 0   Asymptomatic Yes   Total Minutes 61       Edita Wells RN 11/21/2022 11:15 AM

## 2022-11-23 ENCOUNTER — HOSPITAL ENCOUNTER (OUTPATIENT)
Dept: CARDIAC REHAB | Age: 72
Discharge: HOME | End: 2022-11-23
Payer: MEDICARE

## 2022-11-23 VITALS — WEIGHT: 169 LBS | BODY MASS INDEX: 24.25 KG/M2

## 2022-11-23 PROCEDURE — 93798 PHYS/QHP OP CAR RHAB W/ECG: CPT

## 2022-11-25 ENCOUNTER — APPOINTMENT (OUTPATIENT)
Dept: CARDIAC REHAB | Age: 72
End: 2022-11-25
Payer: MEDICARE

## 2022-11-28 ENCOUNTER — APPOINTMENT (OUTPATIENT)
Dept: CARDIAC REHAB | Age: 72
End: 2022-11-28
Payer: MEDICARE

## 2022-11-30 ENCOUNTER — HOSPITAL ENCOUNTER (OUTPATIENT)
Dept: CARDIAC REHAB | Age: 72
Discharge: HOME | End: 2022-11-30
Payer: MEDICARE

## 2022-11-30 VITALS — BODY MASS INDEX: 24.25 KG/M2 | WEIGHT: 169 LBS

## 2022-11-30 PROCEDURE — 93798 PHYS/QHP OP CAR RHAB W/ECG: CPT

## 2022-12-02 ENCOUNTER — HOSPITAL ENCOUNTER (OUTPATIENT)
Dept: CARDIAC REHAB | Age: 72
Discharge: HOME | End: 2022-12-02
Payer: MEDICARE

## 2022-12-02 VITALS — WEIGHT: 169 LBS | BODY MASS INDEX: 24.25 KG/M2

## 2022-12-02 PROCEDURE — 93798 PHYS/QHP OP CAR RHAB W/ECG: CPT

## 2022-12-05 ENCOUNTER — HOSPITAL ENCOUNTER (OUTPATIENT)
Dept: CARDIAC REHAB | Age: 72
Discharge: HOME | End: 2022-12-05
Payer: MEDICARE

## 2022-12-05 VITALS — BODY MASS INDEX: 24.39 KG/M2 | WEIGHT: 170 LBS

## 2022-12-05 PROCEDURE — 93798 PHYS/QHP OP CAR RHAB W/ECG: CPT

## 2022-12-07 ENCOUNTER — HOSPITAL ENCOUNTER (OUTPATIENT)
Dept: CARDIAC REHAB | Age: 72
Discharge: HOME | End: 2022-12-07
Payer: MEDICARE

## 2022-12-07 VITALS — WEIGHT: 172 LBS | BODY MASS INDEX: 24.68 KG/M2

## 2022-12-07 PROCEDURE — 93798 PHYS/QHP OP CAR RHAB W/ECG: CPT

## 2022-12-08 ENCOUNTER — APPOINTMENT (OUTPATIENT)
Dept: GENERAL RADIOLOGY | Age: 72
End: 2022-12-08
Attending: EMERGENCY MEDICINE
Payer: MEDICARE

## 2022-12-08 ENCOUNTER — HOSPITAL ENCOUNTER (EMERGENCY)
Age: 72
Discharge: HOME | End: 2022-12-08
Attending: EMERGENCY MEDICINE
Payer: MEDICARE

## 2022-12-08 VITALS
WEIGHT: 166 LBS | BODY MASS INDEX: 23.77 KG/M2 | DIASTOLIC BLOOD PRESSURE: 49 MMHG | HEART RATE: 61 BPM | HEIGHT: 70 IN | SYSTOLIC BLOOD PRESSURE: 105 MMHG | RESPIRATION RATE: 16 BRPM | OXYGEN SATURATION: 100 % | TEMPERATURE: 97.6 F

## 2022-12-08 DIAGNOSIS — R00.2 PALPITATIONS: Primary | ICD-10-CM

## 2022-12-08 DIAGNOSIS — R42 LIGHTHEADEDNESS: ICD-10-CM

## 2022-12-08 LAB
ALBUMIN SERPL-MCNC: 3.7 G/DL (ref 3.4–5)
ALBUMIN/GLOB SERPL: 1.2 {RATIO} (ref 0.8–1.7)
ALP SERPL-CCNC: 41 U/L (ref 45–117)
ALT SERPL-CCNC: 48 U/L (ref 16–61)
ANION GAP SERPL CALC-SCNC: 2 MMOL/L (ref 3–18)
AST SERPL-CCNC: 27 U/L (ref 10–38)
ATRIAL RATE: 50 BPM
ATRIAL RATE: 52 BPM
BASOPHILS # BLD: 0 K/UL (ref 0–0.1)
BASOPHILS NFR BLD: 1 % (ref 0–2)
BILIRUB SERPL-MCNC: 0.6 MG/DL (ref 0.2–1)
BUN SERPL-MCNC: 27 MG/DL (ref 7–18)
BUN/CREAT SERPL: 24 (ref 12–20)
CALCIUM SERPL-MCNC: 9.5 MG/DL (ref 8.5–10.1)
CALCULATED P AXIS, ECG09: 24 DEGREES
CALCULATED P AXIS, ECG09: 50 DEGREES
CALCULATED R AXIS, ECG10: 24 DEGREES
CALCULATED R AXIS, ECG10: 8 DEGREES
CALCULATED T AXIS, ECG11: 110 DEGREES
CALCULATED T AXIS, ECG11: 139 DEGREES
CHLORIDE SERPL-SCNC: 106 MMOL/L (ref 100–111)
CO2 SERPL-SCNC: 30 MMOL/L (ref 21–32)
CREAT SERPL-MCNC: 1.11 MG/DL (ref 0.6–1.3)
DIAGNOSIS, 93000: NORMAL
DIAGNOSIS, 93000: NORMAL
DIFFERENTIAL METHOD BLD: ABNORMAL
EOSINOPHIL # BLD: 0.2 K/UL (ref 0–0.4)
EOSINOPHIL NFR BLD: 5 % (ref 0–5)
ERYTHROCYTE [DISTWIDTH] IN BLOOD BY AUTOMATED COUNT: 13.1 % (ref 11.6–14.5)
GLOBULIN SER CALC-MCNC: 3 G/DL (ref 2–4)
GLUCOSE SERPL-MCNC: 102 MG/DL (ref 74–99)
HCT VFR BLD AUTO: 37.5 % (ref 36–48)
HGB BLD-MCNC: 12.9 G/DL (ref 13–16)
IMM GRANULOCYTES # BLD AUTO: 0 K/UL (ref 0–0.04)
IMM GRANULOCYTES NFR BLD AUTO: 1 % (ref 0–0.5)
LYMPHOCYTES # BLD: 1.2 K/UL (ref 0.9–3.6)
LYMPHOCYTES NFR BLD: 25 % (ref 21–52)
MAGNESIUM SERPL-MCNC: 2.2 MG/DL (ref 1.6–2.6)
MCH RBC QN AUTO: 31.9 PG (ref 24–34)
MCHC RBC AUTO-ENTMCNC: 34.4 G/DL (ref 31–37)
MCV RBC AUTO: 92.8 FL (ref 78–100)
MONOCYTES # BLD: 0.5 K/UL (ref 0.05–1.2)
MONOCYTES NFR BLD: 10 % (ref 3–10)
NEUTS SEG # BLD: 2.8 K/UL (ref 1.8–8)
NEUTS SEG NFR BLD: 59 % (ref 40–73)
NRBC # BLD: 0 K/UL (ref 0–0.01)
NRBC BLD-RTO: 0 PER 100 WBC
P-R INTERVAL, ECG05: 252 MS
P-R INTERVAL, ECG05: 256 MS
PLATELET # BLD AUTO: 145 K/UL (ref 135–420)
PMV BLD AUTO: 9.9 FL (ref 9.2–11.8)
POTASSIUM SERPL-SCNC: 4.7 MMOL/L (ref 3.5–5.5)
PROT SERPL-MCNC: 6.7 G/DL (ref 6.4–8.2)
Q-T INTERVAL, ECG07: 452 MS
Q-T INTERVAL, ECG07: 476 MS
QRS DURATION, ECG06: 76 MS
QRS DURATION, ECG06: 80 MS
QTC CALCULATION (BEZET), ECG08: 420 MS
QTC CALCULATION (BEZET), ECG08: 433 MS
RBC # BLD AUTO: 4.04 M/UL (ref 4.35–5.65)
SODIUM SERPL-SCNC: 138 MMOL/L (ref 136–145)
TROPONIN-HIGH SENSITIVITY: 15 NG/L (ref 0–78)
TROPONIN-HIGH SENSITIVITY: 17 NG/L (ref 0–78)
VENTRICULAR RATE, ECG03: 50 BPM
VENTRICULAR RATE, ECG03: 52 BPM
WBC # BLD AUTO: 4.7 K/UL (ref 4.6–13.2)

## 2022-12-08 PROCEDURE — 71045 X-RAY EXAM CHEST 1 VIEW: CPT

## 2022-12-08 PROCEDURE — 85025 COMPLETE CBC W/AUTO DIFF WBC: CPT

## 2022-12-08 PROCEDURE — 74011250636 HC RX REV CODE- 250/636: Performed by: EMERGENCY MEDICINE

## 2022-12-08 PROCEDURE — 93005 ELECTROCARDIOGRAM TRACING: CPT

## 2022-12-08 PROCEDURE — 83735 ASSAY OF MAGNESIUM: CPT

## 2022-12-08 PROCEDURE — 84484 ASSAY OF TROPONIN QUANT: CPT

## 2022-12-08 PROCEDURE — 80053 COMPREHEN METABOLIC PANEL: CPT

## 2022-12-08 PROCEDURE — 99285 EMERGENCY DEPT VISIT HI MDM: CPT

## 2022-12-08 RX ADMIN — SODIUM CHLORIDE 1000 ML: 9 INJECTION, SOLUTION INTRAVENOUS at 11:13

## 2022-12-08 NOTE — ED TRIAGE NOTES
Patient reports he has been having dizziness in past couple of days then last night started having palpitations.  Denies chest pain

## 2022-12-08 NOTE — DISCHARGE INSTRUCTIONS
Stop taking your metoprolol until after evaluated by cardiology. Call Dr. Bridget Taylor office for follow-up. Return to the ED for worsening symptoms or for other concerns.

## 2022-12-08 NOTE — ED PROVIDER NOTES
EMERGENCY DEPARTMENT HISTORY AND PHYSICAL EXAM    Date: 12/8/2022  Patient Name: Brianna Florian    History of Presenting Illness     Chief Complaint   Patient presents with    Palpitations    Dizziness       History Provided By: Patient and Patient's Wife     History Mehran Torrez):   9:54 AM  Brianna Florian is a 74 y.o. male with a PMHX of Hyperlipidemia, CAD with stents  who presents to the emergency department (room 9) C/O heart palpitations onset this morning. Associated sxs include lightheadedness. Pt denies syncope or any other sxs or complaints. Patient states that he has a history of syncopal episodes and follows with Dr. Ollie Welch in cardiology. He had a stent placed earlier this year for an LAD occlusion. Today he had palpitations and lightheadedness. He typically lays down flat when he has lightheadedness to prevent himself from passing out which he has done in the past but not today. Chief Complaint: Heart palpitations  Onset: Today  Timing:  Acute  Context: Symptoms started spontaneously, symptoms have not improved since onset  Location: Chest  Quality:  Palpitations  Severity: Moderate  Modifying Factors: Nothing makes it better, or worse.   Associated Symptoms:  Lightheadedness    PCP: EMMANUEL Tobias     Past History         Past Medical History:  Past Medical History:   Diagnosis Date    Arrhythmia     Iregular heartbeat    BPH (benign prostatic hyperplasia)     DVT (deep venous thrombosis) (HCC)     GERD (gastroesophageal reflux disease)     Hyperlipemia        Past Surgical History:  Past Surgical History:   Procedure Laterality Date    HX GI  2009    Colon resection    HX HEENT      HX ORTHOPAEDIC Left 2017    Left Rotator cuff repair    HX OTHER SURGICAL      rt rotatyor cuff repair twicw in 2019       Family History:  Family History   Problem Relation Age of Onset    No Known Problems Mother     Heart Disease Father     Heart Disease Brother    Reviewed and non-contributory    Social History:  Social History     Tobacco Use    Smoking status: Never    Smokeless tobacco: Never   Vaping Use    Vaping Use: Never used   Substance Use Topics    Alcohol use: Not Currently     Comment: 1=2 x week    Drug use: No       Medications:  Current Outpatient Medications   Medication Sig Dispense Refill    melatonin 5 mg tablet Take  by mouth nightly. Indications: INSOMNIA      apixaban (Eliquis) 2.5 mg tablet Take 1 Tablet by mouth two (2) times a day. 60 Tablet 1    atorvastatin (LIPITOR) 40 mg tablet Take 1 Tablet by mouth nightly. 30 Tablet 2    aspirin delayed-release 81 mg tablet Take 1 Tablet by mouth in the morning. 30 Tablet 2    clopidogreL (PLAVIX) 75 mg tab Take 1 Tablet by mouth in the morning. 30 Tablet 2    ranolazine ER (RANEXA) 500 mg SR tablet Take 1 Tablet by mouth two (2) times a day. 60 Tablet 2    cyanocobalamin (VITAMIN B12) 500 mcg tablet Take 500 mcg by mouth in the morning. cholecalciferol, vitamin D3, 50 mcg (2,000 unit) tab Take 1,000 mcg by mouth in the morning. tamsulosin (FLOMAX) 0.4 mg capsule Take 0.4 mg by mouth in the morning. (Patient not taking: Reported on 12/8/2022)      esomeprazole (NEXIUM) 40 mg capsule Take  by mouth as needed. acetaminophen (TYLENOL) 650 mg TbER Take 650 mg by mouth every eight (8) hours. Allergies:   Allergies   Allergen Reactions    Adhesive Rash       Social Determinants of Health:  Social Determinants of Health     Tobacco Use: Low Risk     Smoking Tobacco Use: Never    Smokeless Tobacco Use: Never    Passive Exposure: Not on file   Alcohol Use: Not on file   Financial Resource Strain: Not on file   Food Insecurity: Not on file   Transportation Needs: Not on file   Physical Activity: Not on file   Stress: Not on file   Social Connections: Not on file   Intimate Partner Violence: Not on file   Depression: Not at risk    PHQ-2 Score: 1   Housing Stability: Not on file       Review of Systems      Review of Systems Constitutional:  Negative for chills and fever. HENT:  Negative for rhinorrhea and sore throat. Eyes:  Negative for pain and visual disturbance. Respiratory:  Negative for chest tightness, shortness of breath and wheezing. Cardiovascular:  Positive for palpitations. Negative for chest pain. Gastrointestinal:  Negative for abdominal pain, diarrhea, nausea and vomiting. Musculoskeletal:  Negative for arthralgias and myalgias. Skin:  Negative for rash and wound. Neurological:  Positive for light-headedness. Negative for speech difficulty and headaches. Psychiatric/Behavioral:  Negative for agitation and confusion. All other systems reviewed and are negative. Physical Exam     Vitals:    12/08/22 1032 12/08/22 1101 12/08/22 1201 12/08/22 1247   BP: 118/67 (!) 123/58 120/61 123/78   Pulse: (!) 53 (!) 54 (!) 48 (!) 57   Resp: (!) 6 11 12 13   Temp:       SpO2: 99% 97% 100% 99%   Weight:       Height:           Physical Exam  Vitals and nursing note reviewed. Constitutional:       General: He is not in acute distress. Appearance: Normal appearance. He is normal weight. He is not ill-appearing. HENT:      Head: Normocephalic and atraumatic. Nose: Nose normal. No rhinorrhea. Mouth/Throat:      Mouth: Mucous membranes are moist.      Pharynx: No oropharyngeal exudate or posterior oropharyngeal erythema. Eyes:      Extraocular Movements: Extraocular movements intact. Conjunctiva/sclera: Conjunctivae normal.      Pupils: Pupils are equal, round, and reactive to light. Cardiovascular:      Rate and Rhythm: Normal rate and regular rhythm. Heart sounds: No murmur heard. No friction rub. No gallop. Pulmonary:      Effort: Pulmonary effort is normal. No respiratory distress. Breath sounds: Normal breath sounds. No wheezing, rhonchi or rales. Abdominal:      General: Bowel sounds are normal.      Palpations: Abdomen is soft. Tenderness:  There is no abdominal tenderness. There is no guarding or rebound. Musculoskeletal:         General: No swelling, tenderness or deformity. Normal range of motion. Cervical back: Normal range of motion and neck supple. No rigidity. Lymphadenopathy:      Cervical: No cervical adenopathy. Skin:     General: Skin is warm and dry. Findings: No rash. Neurological:      General: No focal deficit present. Mental Status: He is alert and oriented to person, place, and time. Cranial Nerves: Cranial nerves 2-12 are intact. No cranial nerve deficit, dysarthria or facial asymmetry. Sensory: Sensation is intact. No sensory deficit. Motor: Motor function is intact. No weakness, tremor, atrophy, abnormal muscle tone, seizure activity or pronator drift. Coordination: Coordination is intact. Romberg sign negative.  Coordination normal. Finger-Nose-Finger Test normal. Rapid alternating movements normal.      Comments: No truncal ataxia, normal test of skew   Psychiatric:         Mood and Affect: Mood normal.         Behavior: Behavior normal.       Diagnostic Study Results     Labs -  Recent Results (from the past 12 hour(s))   EKG, 12 LEAD, INITIAL    Collection Time: 12/08/22  9:07 AM   Result Value Ref Range    Ventricular Rate 52 BPM    Atrial Rate 52 BPM    P-R Interval 256 ms    QRS Duration 76 ms    Q-T Interval 452 ms    QTC Calculation (Bezet) 420 ms    Calculated P Axis 50 degrees    Calculated R Axis 24 degrees    Calculated T Axis 110 degrees    Diagnosis       Sinus bradycardia with 1st degree AV block  Nonspecific ST and T wave abnormality  Abnormal ECG  When compared with ECG of 10-AUG-2022 16:02,  ST no longer depressed in Inferior leads  ST no longer depressed in Anterolateral leads  T wave inversion no longer evident in Inferior leads  T wave inversion no longer evident in Anterior leads     CBC WITH AUTOMATED DIFF    Collection Time: 12/08/22  9:15 AM   Result Value Ref Range    WBC 4.7 4.6 - 13.2 K/uL    RBC 4.04 (L) 4.35 - 5.65 M/uL    HGB 12.9 (L) 13.0 - 16.0 g/dL    HCT 37.5 36.0 - 48.0 %    MCV 92.8 78.0 - 100.0 FL    MCH 31.9 24.0 - 34.0 PG    MCHC 34.4 31.0 - 37.0 g/dL    RDW 13.1 11.6 - 14.5 %    PLATELET 480 578 - 254 K/uL    MPV 9.9 9.2 - 11.8 FL    NRBC 0.0 0  WBC    ABSOLUTE NRBC 0.00 0.00 - 0.01 K/uL    NEUTROPHILS 59 40 - 73 %    LYMPHOCYTES 25 21 - 52 %    MONOCYTES 10 3 - 10 %    EOSINOPHILS 5 0 - 5 %    BASOPHILS 1 0 - 2 %    IMMATURE GRANULOCYTES 1 (H) 0.0 - 0.5 %    ABS. NEUTROPHILS 2.8 1.8 - 8.0 K/UL    ABS. LYMPHOCYTES 1.2 0.9 - 3.6 K/UL    ABS. MONOCYTES 0.5 0.05 - 1.2 K/UL    ABS. EOSINOPHILS 0.2 0.0 - 0.4 K/UL    ABS. BASOPHILS 0.0 0.0 - 0.1 K/UL    ABS. IMM. GRANS. 0.0 0.00 - 0.04 K/UL    DF AUTOMATED     METABOLIC PANEL, COMPREHENSIVE    Collection Time: 12/08/22  9:15 AM   Result Value Ref Range    Sodium 138 136 - 145 mmol/L    Potassium 4.7 3.5 - 5.5 mmol/L    Chloride 106 100 - 111 mmol/L    CO2 30 21 - 32 mmol/L    Anion gap 2 (L) 3.0 - 18 mmol/L    Glucose 102 (H) 74 - 99 mg/dL    BUN 27 (H) 7.0 - 18 MG/DL    Creatinine 1.11 0.6 - 1.3 MG/DL    BUN/Creatinine ratio 24 (H) 12 - 20      eGFR >60 >60 ml/min/1.73m2    Calcium 9.5 8.5 - 10.1 MG/DL    Bilirubin, total 0.6 0.2 - 1.0 MG/DL    ALT (SGPT) 48 16 - 61 U/L    AST (SGOT) 27 10 - 38 U/L    Alk.  phosphatase 41 (L) 45 - 117 U/L    Protein, total 6.7 6.4 - 8.2 g/dL    Albumin 3.7 3.4 - 5.0 g/dL    Globulin 3.0 2.0 - 4.0 g/dL    A-G Ratio 1.2 0.8 - 1.7     TROPONIN-HIGH SENSITIVITY    Collection Time: 12/08/22  9:15 AM   Result Value Ref Range    Troponin-High Sensitivity 17 0 - 78 ng/L   MAGNESIUM    Collection Time: 12/08/22  9:15 AM   Result Value Ref Range    Magnesium 2.2 1.6 - 2.6 mg/dL   EKG, 12 LEAD, SUBSEQUENT    Collection Time: 12/08/22 11:08 AM   Result Value Ref Range    Ventricular Rate 50 BPM    Atrial Rate 50 BPM    P-R Interval 252 ms    QRS Duration 80 ms    Q-T Interval 476 ms    QTC Calculation (Ines) 433 ms    Calculated P Axis 24 degrees    Calculated R Axis 8 degrees    Calculated T Axis 139 degrees    Diagnosis       Sinus bradycardia with 1st degree AV block  Minimal voltage criteria for LVH, may be normal variant ( R in aVL )  T wave abnormality, consider lateral ischemia  Abnormal ECG  When compared with ECG of 08-DEC-2022 09:07,  No significant change was found     TROPONIN-HIGH SENSITIVITY    Collection Time: 12/08/22 11:15 AM   Result Value Ref Range    Troponin-High Sensitivity 15 0 - 78 ng/L       Radiologic Studies -   XR CHEST PORT   Final Result      No acute findings in the chest.         CT Results  (Last 48 hours)      None          CXR Results  (Last 48 hours)                 12/08/22 0935  XR CHEST PORT Final result    Impression:      No acute findings in the chest.        Narrative:  EXAM: XR CHEST PORT       CLINICAL INDICATION/HISTORY: cp   -Additional: None       COMPARISON: 8/6/2022       TECHNIQUE: Frontal view of the chest       _______________       FINDINGS:       HEART AND MEDIASTINUM: Normal cardiac size and mediastinal contours. LUNGS AND PLEURAL SPACES: No focal pneumonic consolidation, pneumothorax, or   pleural effusion. BONY THORAX AND SOFT TISSUES: No acute osseous abnormality       _______________                   Medications given in the ED-  Medications   sodium chloride 0.9 % bolus infusion 1,000 mL (1,000 mL IntraVENous New Bag 12/8/22 1113)       Procedures     Procedures    ED Course     I Aydin Martinez MD) am the first provider for this patient. I reviewed the vital signs, available nursing notes, past medical history, past surgical history, family history and social history. Records Reviewed: Nursing Notes    Cardiac Monitor:  Rate: 55 bpm  Rhythm: sinus rhythm    Pulse Oximetry Analysis - 100% on RA    EKG interpretation: (Preliminary)  Rhythm: Sinus bradycardia with first-degree AV block.  Rate: 52 bpm; no STEMI, VA: 256 ms  EKG read by Pretty Walsh Maged Berry MD at 9:07 AM    EKG interpretation: (Repeat)  Rhythm: Sinus bradycardia with first-degree AV block. Rate: 50 bpm; no STEMI, W[place]: 252 ms  EKG read by David Mccabe MD at 11:08 PM    9:54 AM Initial assessment performed. The patients presenting problems have been discussed, and they are in agreement with the care plan formulated and outlined with them. I have encouraged them to ask questions as they arise throughout their visit. ED Course as of 22 1257   u Dec 08, 2022   1254 Discussed with Dr. Jerrell David, hold metoprolol follow-up as an outpatient. [JM]      ED Course User Index  [JM] France Oconnell MD       HEART Score:    History:  0: Slightly suspicious  EK: Normal  Age:  2: Over 72  Risk Factors:  2: 3+, Prior CAD, PVD, CVA  Risk Factors:  Hypercholesterolemia, Prior CAD  Troponin 1: 17 ng/L  Troponin 2: 15 ng/L    Total: 4  4-6: Intermediate risk: 12-16.6% risk of Major Adverse Cardiac Event at 6 weeks, requires further management. Troponin increase by 1.4x or more: No      Medical Decision Making     Provider Notes (Medical Decision Making):   DDX: ACS, syncope, presyncope, palpitations    Discussion:  74 y.o. male with no active chest pain, was having palpitations today and lightheadedness. He has had this several times in the past.  Patient had 2 normal EKGs, normal chest x-ray and 2 downtrending troponins in the normal range. This leads me to believe he has a very low probability of ischemic cardiac disease or ACS. Although the patient has an intermediate risk heart score this is all based on pre-existing risk factors. He does have good follow-up with cardiology. Discussed with cardiology today for outpatient follow-up. Cardiologist also recommends holding beta-blocker. Also engage patient in shared decision-making he does not wish to stay in the hospital.  Patient can follow-up with his cardiologist.  He understands and agrees with this plan.       Diagnosis and Disposition     DISCHARGE NOTE:  12:57 PM   Familia Mccann's  results have been reviewed with him. He has been counseled regarding his diagnosis, treatment, and plan. He verbally conveys understanding and agreement of the signs, symptoms, diagnosis, treatment and prognosis and additionally agrees to follow up as discussed. He also agrees with the care-plan and conveys that all of his questions have been answered. I have also provided discharge instructions for him that include: educational information regarding their diagnosis and treatment, and list of reasons why they would want to return to the ED prior to their follow-up appointment, should his condition change. He has been provided with education for proper emergency department utilization. CLINICAL IMPRESSION:    1. Palpitations    2. Lightheadedness        PLAN:  1. D/C Home  2. Current Discharge Medication List        STOP taking these medications       metoprolol tartrate (LOPRESSOR) 25 mg tablet Comments:   Reason for Stopping:             3.   Follow-up Information       Follow up With Specialties Details Why Contact Info    Blanka Bennett MD Cardiovascular Disease Physician Schedule an appointment as soon as possible for a visit  As soon as possible, For follow up from Emergency Department visit. [address]  [address] [place]   [phone]      [place], [place] Physician Assistant Schedule an appointment as soon as possible for a visit  As soon as possible, For follow up from Emergency Department visit. [facility]  [phone]      [facility] EMERGENCY DEPT Emergency Medicine  As needed; If symptoms worsen [address] [place] [phone] Bren Holland MD am the primary clinician of record. Taniya Disclaimer     Please note that this dictation was completed with Piper, the computer voice recognition software.   Quite often unanticipated grammatical, syntax, homophones, and other interpretive errors are inadvertently transcribed by the computer software. Please disregard these errors. Please excuse any errors that have escaped final proofreading.     Flynn Ibarra MD

## 2022-12-09 ENCOUNTER — HOSPITAL ENCOUNTER (OUTPATIENT)
Dept: CARDIAC REHAB | Age: 72
Discharge: HOME | End: 2022-12-09
Payer: MEDICARE

## 2022-12-09 VITALS — WEIGHT: 169 LBS | BODY MASS INDEX: 24.25 KG/M2

## 2022-12-09 PROCEDURE — 93798 PHYS/QHP OP CAR RHAB W/ECG: CPT

## 2022-12-12 ENCOUNTER — HOSPITAL ENCOUNTER (OUTPATIENT)
Dept: CARDIAC REHAB | Age: 72
Discharge: HOME | End: 2022-12-12
Payer: MEDICARE

## 2022-12-12 VITALS — WEIGHT: 170 LBS | BODY MASS INDEX: 24.39 KG/M2

## 2022-12-12 LAB
ATRIAL RATE: 52 BPM
CALCULATED P AXIS, ECG09: 50 DEGREES
CALCULATED R AXIS, ECG10: 24 DEGREES
CALCULATED T AXIS, ECG11: 110 DEGREES
DIAGNOSIS, 93000: NORMAL
P-R INTERVAL, ECG05: 256 MS
Q-T INTERVAL, ECG07: 452 MS
QRS DURATION, ECG06: 76 MS
QTC CALCULATION (BEZET), ECG08: 420 MS
VENTRICULAR RATE, ECG03: 52 BPM

## 2022-12-12 PROCEDURE — 93798 PHYS/QHP OP CAR RHAB W/ECG: CPT

## 2022-12-14 ENCOUNTER — HOSPITAL ENCOUNTER (OUTPATIENT)
Dept: CARDIAC REHAB | Age: 72
Discharge: HOME | End: 2022-12-14
Payer: MEDICARE

## 2022-12-14 VITALS — BODY MASS INDEX: 24.25 KG/M2 | WEIGHT: 169 LBS

## 2022-12-14 LAB
ATRIAL RATE: 50 BPM
CALCULATED P AXIS, ECG09: 24 DEGREES
CALCULATED R AXIS, ECG10: 8 DEGREES
CALCULATED T AXIS, ECG11: 139 DEGREES
DIAGNOSIS, 93000: NORMAL
P-R INTERVAL, ECG05: 252 MS
Q-T INTERVAL, ECG07: 476 MS
QRS DURATION, ECG06: 80 MS
QTC CALCULATION (BEZET), ECG08: 433 MS
VENTRICULAR RATE, ECG03: 50 BPM

## 2022-12-14 PROCEDURE — 93798 PHYS/QHP OP CAR RHAB W/ECG: CPT

## 2022-12-16 ENCOUNTER — HOSPITAL ENCOUNTER (OUTPATIENT)
Dept: CARDIAC REHAB | Age: 72
Discharge: HOME | End: 2022-12-16
Payer: MEDICARE

## 2022-12-16 VITALS — WEIGHT: 171 LBS | BODY MASS INDEX: 24.54 KG/M2

## 2022-12-16 PROCEDURE — 93798 PHYS/QHP OP CAR RHAB W/ECG: CPT

## 2022-12-19 ENCOUNTER — HOSPITAL ENCOUNTER (OUTPATIENT)
Dept: CARDIAC REHAB | Age: 72
Discharge: HOME | End: 2022-12-19
Payer: MEDICARE

## 2022-12-19 VITALS — WEIGHT: 171 LBS | BODY MASS INDEX: 24.54 KG/M2

## 2022-12-19 PROCEDURE — 93798 PHYS/QHP OP CAR RHAB W/ECG: CPT

## 2022-12-21 ENCOUNTER — HOSPITAL ENCOUNTER (OUTPATIENT)
Dept: CARDIAC REHAB | Age: 72
Discharge: HOME | End: 2022-12-21
Payer: MEDICARE

## 2022-12-21 VITALS — BODY MASS INDEX: 24.54 KG/M2 | WEIGHT: 171 LBS

## 2022-12-21 PROCEDURE — 93798 PHYS/QHP OP CAR RHAB W/ECG: CPT

## 2022-12-21 NOTE — PROGRESS NOTES
Lawrence Shea RN 2022 8:22 AM CARDIAC REHAB ITP REASSESSMENT FOR REVIEW AND SIGNATURE  Patient name: Damian Smith : 1950     Visits from Start of Care:       Reporting Period: 2022 to 2022    Subjective Reports: Pt is progressing, no complaints. Goals Comments   1. Increase resistance and stamina by the end of the program.   [] met                  [] not met  [x] progressing  Pt will increase resistance and stamina to increase cardiac output. 2. Watch educational video on cardiac medication prescribed by the end of the program.    [] met                  [] not met  [x] progressing Pt will understand the action and side effects of cardiac medication prescribed. 3. Utilize three different machines per session by the end of the program.     [] met                  [] not met  [x] progressing Pt will utilities three machines to increase cardiac perfusion. Key functional changes: Mr. India Dennis has increase speed and resistance on the recumbent bike and treadmill. Problems/ barriers to goal attainment: None. Assessment / Recommendations:Continue w/ rehab 3x week. 22 0809   Treatment Diagnosis   Treatment Diagnosis 1 PCI  (PTCA w/stent)   Referral Date 22   OXYGEN INTERVENTION   Oxygen Use No   Individual Treatment Plan   ITP Visit Type Re-Assessment   1st Date of Exercise  22   ITP Next Review Date 22   Visit #/Total Visits    EF % 40 %   Risk Stratification High   ITP Exercise;Psychosocial;Tobacco;Education;Nutrition   Exercise    Stages of Change Action   Assisted Devices None   Exercise Prescription   Mode Treadmill;Bike;Elliptical;Ergometer; Stepper   Frequency per week 2-3   Duration per session 31-45   Intensity  METS       3.32   RPE 11-13   Progression Progressing   Symptoms with Exercise   (None)   Target Heart Rate    Resistance Training Yes   Exercise Blood Pressures   Resting /71   Peak /79   Is BP WDL?  Yes Exercise Activity at Home   Type walking   Frequency daily   Duration 45   Resistance Training No   Exercise Education   Education Self pulse;Exercise safety;Signs/Symptoms to report;Physically active;RPE scale   Exercise Target Goal   Target Goal(s) Individual exercise RX;BP < 140/90 or < 130/80, if DM or CKD; Aerobic activity 30 + minutes/day  5 days/week   Patient Stated Exercise Goals Increase stamina and endurance   Psychosocial   Stages of Change Action   Psychosocial Intervention   Interventions No intervention indicated   Currently Taking Psychotropic Meds No   Medication Changes No   Psychosocial Target Goals   Target Goal(s) Assess presence or absence of depression using a valid screening tool   Tobacco   Tobacco Use No   Nutrition   Stages of Change Action   Diabetes No   Lipids   Date Lipids Drawn 08/07/22   Total 239   HDL 45   .8   Triglycerides 56   Lipid Med(s) lipitor   Lipid Med Change(s) No   Weight Management   Waist Circumference  38   Alcohol None   Weight  77.6 kg (171 lb)   Height  5' 10\" (1.778 m)   BMI 24.59   Nutrition Intervention   Dietitian Consult No   Nurse/Patient Discussion Yes   Nutrition Class No   Diabetes Education Referral No   Lipid Clinic Referral No   Weight Management Referral No   Nutrition Education   Education Low fat & cholesterol diet;Carb-controlled diet; Low sodium diet; Healthy eating;Special diet   Nutrition Target Goals   Target Goals BMI less than 25;LDL-C less than 70 for high risk;LDL-C 70 high risk   Education   Learning Barrier Ready to learn   Education Intervention   Education Schedule Given Yes   Patient Education    Education CAD;Risk factors;Signs/Symptoms of Angina;Cardiac A&P; Med Compliance   Hypertension No   Hypertension Controlled No   Is BP WDL?  Yes   Med(s) Change Yes  (D/C Metoprolol medication.)   BP Meds   (None)   ACE/ARB Prescribed No   ASA Prescribed Yes   BB Prescribed No   ASA Adherent Yes   Statin Intensity High   Statins Adherent Yes   Education Target Goals   Target Goals Medication compliance; Understand target guidelines for B/P;Risk factors; Understand target guidelines for lipids   Treatment Goals   Goals Exercise;Blood pressure;Nutrients;Lipids;Weight management;Medications;Education   Exercise Goal Get back on the [employer] course   Exercise Goal Status Progressing        12/19/22 1217   Rehab Common Questions   Any problems changes since your last visit Denies   Any symptoms while exercising Denies   Psychosocial/Stress Level 1   Resting EKG rhythm SR w/ 1st degree block   Tobacco Use None   ITP Next Review Date 01/16/23   Visit Number/Total Visits 33/36   What is plan for next session Exercise   On Call Medical Director Immediately Available [facility]   Exercise Treatment Log   Target Heart Rate(Range)    Resting HR 78   Resting /71   Recovery HR 92   Recovery /79   Weight 77.6 kg (171 lb)   Exercise EKG Rhythm SR/ST w/ 1st degree block   Exercise Duration 54   Peak    Peak /79   Peak RPE 13   SOB 0   Angina 0   Claudulation 0   Asymptomatic Yes   Total Minutes 59       Edita Wells RN 12/21/2022 8:22 AM

## 2022-12-23 ENCOUNTER — HOSPITAL ENCOUNTER (OUTPATIENT)
Dept: CARDIAC REHAB | Age: 72
Discharge: HOME | End: 2022-12-23
Payer: MEDICARE

## 2022-12-23 VITALS — WEIGHT: 171 LBS | BODY MASS INDEX: 24.54 KG/M2

## 2022-12-23 PROCEDURE — 93797 PHYS/QHP OP CAR RHAB WO ECG: CPT

## 2022-12-23 PROCEDURE — 93798 PHYS/QHP OP CAR RHAB W/ECG: CPT

## 2022-12-23 NOTE — PROGRESS NOTES
CARDIAC REHAB DISCHARGE NOTE FOR REVIEW AND SIGNATURE      Patient: Kaushik Schaefer (1 y.o. male)  Date: 12/23/2022  Primary Diagnosis: PTCA w/ STENT Z95.5      Mr. Isha Shaikh has completed Phase II of Cardiac Rehab; having attended 36 sessions from 9/30/2022-12/23/2022    Patient is interested in maintaining optimal health and will work with referring physician Elvis Harley. He has improved endurance and stamina through regular exercise during the program. Weight loss since SOC:0 and 0 inches from waist. Blood pressure at discharge is 110/70 and is WNL. [facility] not [facility]. PHQ-9 Depression Inventory score has not improved. Pt's MET level has not increased during 6-Minute Walk Test. These scores/results have been reviewed with patient. Patient is progressing has met his re-certification goals. Patient plans to continue exercising upon discharge (at home, gym, etc). Patient education completed in collaboration with RN/EP for revised goals upon discharge, to include: recommended cardio equipment; light weights; and walking 3-5 x week. Discharge Recommendations:   Mr. Isha Shaikh states he has a treadmill and recumbent bike that he utilizes at home. He has a blood pressure machine and is maintaining a healthy diet.         12/23/22 1020   Treatment Diagnosis   Treatment Diagnosis 1 PCI  (PTCA w/stent)   Referral Date 09/30/22   OXYGEN INTERVENTION   Oxygen Use No   Individual Treatment Plan   ITP Visit Type Discharge, completed program   1st Date of Exercise  09/30/22   ITP Next Review Date   (Discharged)   Visit #/Total Visits 36/36   EF % 40 %   Risk Stratification High   ITP Exercise;Psychosocial;Tobacco;Education;Nutrition   Exercise    Stages of Change Maintenance   Assisted Devices None   Test Six minute walk test   Data Measured Before Walk   Heart Rate 70   Blood Pressure 117/84   O2 Saturation 97   O2 Device Room air   RPD 0   RPE 6   Data Measured during Walk   Indicate Mode of RPE 6 minutes   RPE Data Measured During   Treadmill Speed 3.2 mph   Symptoms   (None)   Any problems while exercising None   Do you have shortness of breath No   Describe type of pain you are having None   Peak RPE 10   Peak RPD 0   Data Measured Immediately After Walk   Distance Walked (miles) 0.31   Distance Walked in Feet (calculated) 1636.8 ft   Blood Pressure 112/66   Modified Burt Scale 0   RPE 7   O2 Saturation 96   Data Measured at 5 Minutes After Walk   Heart Rate 66   Blood Pressure 118/62   SpO2 98 %   Exercise Prescription   Mode Treadmill;Bike;Elliptical;Ergometer; Stepper   Frequency per week 2-3   Duration per session 31-45   Intensity  METS       3.32   RPE 11-13   Progression Met/ Discharged   Symptoms with Exercise   (None)   Target Heart Rate    Resistance Training Yes   Exercise Blood Pressures   Resting /70   Peak /84   Is BP WDL? Yes   Exercise Activity at Home   Type walking   Frequency daily   Duration 45   Resistance Training Yes   Exercise Education   Education Self pulse;Exercise safety;Signs/Symptoms to report;Physically active;RPE scale   Exercise Target Goal   Target Goal(s) Individual exercise RX;BP < 140/90 or < 130/80, if DM or CKD; Aerobic activity 30 + minutes/day  5 days/week   Patient Stated Exercise Goals Increase stamina and endurance   Psychosocial   Stages of Change Maintenance   Psychosocial Intervention   Interventions No intervention indicated   Currently Taking Psychotropic Meds No   Medication Changes No   Psychosocial Target Goals   Target Goal(s) Assess presence or absence of depression using a valid screening tool   Tobacco   Tobacco Use No   Smokeless Tobacco Use No   Nutrition   Stages of Change Maintenance   Diabetes No   Lipids   Date Lipids Drawn 08/07/22   Total 239   HDL 45   .8   Triglycerides 56   Lipid Med(s) lipitor   Lipid Med Change(s) No   Weight Management   Weight  77.6 kg (171 lb)   Height  5' 10\" (1.778 m)   BMI 24.59   Waist Circumference  38   Alcohol None   Nutrition Intervention   Dietitian Consult No   Nurse/Patient Discussion Yes   Nutrition Class No   Diabetes Education Referral No   Lipid Clinic Referral No   Weight Management Referral No   Nutrition Education   Education Low fat & cholesterol diet;Carb-controlled diet; Low sodium diet; Healthy eating;Special diet   Nutrition Target Goals   Target Goals BMI less than 25;LDL-C less than 70 for high risk;LDL-C 70 high risk   Education   Learning Barrier Ready to learn   Education Intervention   Education Schedule Given Yes   Patient Education    Education CAD;Risk factors;Signs/Symptoms of Angina;Cardiac A&P; Med Compliance   Hypertension No   Hypertension Controlled No   Is BP WDL? Yes   Med(s) Change No   ACE/ARB Prescribed No   ASA Prescribed Yes   BB Prescribed No   ASA Adherent Yes   Statins Adherent Yes   Statin Intensity High   Education Target Goals   Target Goals Medication compliance; Understand target guidelines for B/P;Risk factors; Understand target guidelines for lipids   Treatment Goals   Goals Exercise;Blood pressure;Nutrients;Lipids;Weight management;Medications;Education   Exercise Goal Get back on the [employer] course   Exercise Goal Status Met          12/23/22 1020   Rehab Common Questions   Any problems changes since your last visit Denies   Any symptoms while exercising Denies   Psychosocial/Stress Level 0   Resting EKG rhythm SR/ST 1st degree Block   Tobacco Use None   Enter O2 Saturation and Liter Flow 97   ITP Next Review Date   (Discharged)   Visit Number/Total Visits 36/36   What is plan for next session Discharged   On Call Medical Director Immediately Available [facility]   Exercise Treatment Log   Target Heart Rate(Range)    Resting HR 76   Resting /84   Recovery    Recovery /70   Weight 77.6 kg (171 lb)   Exercise EKG Rhythm SR/ST 1st degree block   Exercise Duration 62  (31 minutes of exercise and 31 minutes of education video([employer]))   Peak    Peak /84   Peak RPE 10   SOB 0   Angina 0   Claudulation 0   Asymptomatic Yes   O2 Saturation 98   Total Minutes 72       Thank you for this referral.  Desi Hernandez RN 12/23/2022

## 2023-09-30 ENCOUNTER — HOSPITAL ENCOUNTER (EMERGENCY)
Facility: HOSPITAL | Age: 73
Discharge: HOME | End: 2023-09-30
Payer: MEDICARE

## 2023-09-30 VITALS
HEART RATE: 69 BPM | WEIGHT: 175 LBS | BODY MASS INDEX: 25.11 KG/M2 | SYSTOLIC BLOOD PRESSURE: 138 MMHG | OXYGEN SATURATION: 99 % | TEMPERATURE: 97.3 F | DIASTOLIC BLOOD PRESSURE: 76 MMHG | RESPIRATION RATE: 20 BRPM

## 2023-09-30 DIAGNOSIS — Z79.01 ANTICOAGULATED: ICD-10-CM

## 2023-09-30 DIAGNOSIS — R58 BLEEDING: Primary | ICD-10-CM

## 2023-09-30 PROCEDURE — 99282 EMERGENCY DEPT VISIT SF MDM: CPT

## 2023-09-30 NOTE — DISCHARGE INSTRUCTIONS
Call your dermatologist on Monday and let them know that you were seen in the ED today    Keep area dry and covered for 2 full days. After 2 days you can shower with the gauze off. Do not remove the hemostatic agent. You can shower. Pat dry and then cover with a Band-Aid. Allow that to come off on its own. Do not pick it off.     Continue all your home medications including Eliquis  Return to ER if any new or worsening symptoms or new concerns

## 2023-09-30 NOTE — ED PROVIDER NOTES
[address]    [facility] EMERGENCY DEPT  9/30/2023, 5:57 PM EDT    Clinical Impression:  1. Bleeding    2. Anticoagulated        Assessment/Differential Diagnosis:     Ddx postop complication, bleeding, anticoagulated, infection all considered    ED Course:   Initial assessment performed. The patients presenting problems have been discussed, and they are in agreement with the care plan formulated and outlined with them. I have encouraged them to ask questions as they arise throughout their visit. Pt here with bleeding from skin biopsy from skin below his right eye taken yesterday. Pt does take eliquis. Pt states area was cauterized after the procedure yesterday. Today he accidentally brushed against it and knocked off the scab. Continuous bleeding from site. Pt is on eliquis. Exam with small circular lesion 4mm in diameter with slow bleeding from the site. Surrounding tissue appears well. No other lesions bleeding. No ecchymosis. Surgicel clotting agent applied, and a 2X2, and tape to create slt pressure dressing    Recheck 30 min later with no bleeding, gauze dry, no blood or active bleeding  Wound care discussed  Return precautions discussed         Medical Chart Review:  I have reviewed triage nursing documentation. Disposition:  Home  in good condition. Chief Complaint   Patient presents with    Post-op Problem     Pt stated that he had biopsies done on his eyes yesterday and now its bleeding and wont stop. Pt has a dressing applied over the right eye      HPI:    The history is provided by patient. No  used. Rey Wheatley is a 74 y.o. male presenting to the Emergency Department with complaints of bleeding. Pt here with bleeding from skin biopsy from skin below his right eye taken yesterday. Pt does take eliquis. Pt states area was cauterized after the procedure yesterday.  Today he accidentally

## 2024-10-08 ENCOUNTER — HOSPITAL ENCOUNTER (OUTPATIENT)
Facility: HOSPITAL | Age: 74
Discharge: HOME | End: 2024-10-11
Payer: MEDICARE

## 2024-10-08 LAB
ALBUMIN SERPL-MCNC: 3.7 G/DL (ref 3.4–5)
ALBUMIN/GLOB SERPL: 1.2 (ref 0.8–1.7)
ALP SERPL-CCNC: 48 U/L (ref 45–117)
ALT SERPL-CCNC: 42 U/L (ref 16–61)
ANION GAP SERPL CALC-SCNC: 4 MMOL/L (ref 3–18)
AST SERPL-CCNC: 26 U/L (ref 10–38)
BASOPHILS # BLD: 0.1 K/UL (ref 0–0.1)
BASOPHILS NFR BLD: 1 % (ref 0–2)
BILIRUB SERPL-MCNC: 0.5 MG/DL (ref 0.2–1)
BUN SERPL-MCNC: 15 MG/DL (ref 7–18)
BUN/CREAT SERPL: 15 (ref 12–20)
CALCIUM SERPL-MCNC: 9.3 MG/DL (ref 8.5–10.1)
CHLORIDE SERPL-SCNC: 104 MMOL/L (ref 100–111)
CO2 SERPL-SCNC: 30 MMOL/L (ref 21–32)
CREAT SERPL-MCNC: 1.01 MG/DL (ref 0.6–1.3)
DIFFERENTIAL METHOD BLD: ABNORMAL
EOSINOPHIL # BLD: 0.2 K/UL (ref 0–0.4)
EOSINOPHIL NFR BLD: 3 % (ref 0–5)
ERYTHROCYTE [DISTWIDTH] IN BLOOD BY AUTOMATED COUNT: 12.2 % (ref 11.6–14.5)
GLOBULIN SER CALC-MCNC: 3.2 G/DL (ref 2–4)
GLUCOSE SERPL-MCNC: 84 MG/DL (ref 74–99)
HCT VFR BLD AUTO: 47.6 % (ref 36–48)
HGB BLD-MCNC: 16.3 G/DL (ref 13–16)
IMM GRANULOCYTES # BLD AUTO: 0.1 K/UL (ref 0–0.04)
IMM GRANULOCYTES NFR BLD AUTO: 1 % (ref 0–0.5)
INR PPP: 1 (ref 0.9–1.1)
LYMPHOCYTES # BLD: 2.2 K/UL (ref 0.9–3.6)
LYMPHOCYTES NFR BLD: 27 % (ref 21–52)
MCH RBC QN AUTO: 31.8 PG (ref 24–34)
MCHC RBC AUTO-ENTMCNC: 34.2 G/DL (ref 31–37)
MCV RBC AUTO: 92.8 FL (ref 78–100)
MONOCYTES # BLD: 0.8 K/UL (ref 0.05–1.2)
MONOCYTES NFR BLD: 10 % (ref 3–10)
NEUTS SEG # BLD: 4.6 K/UL (ref 1.8–8)
NEUTS SEG NFR BLD: 59 % (ref 40–73)
NRBC # BLD: 0 K/UL (ref 0–0.01)
NRBC BLD-RTO: 0 PER 100 WBC
PLATELET # BLD AUTO: 152 K/UL (ref 135–420)
PMV BLD AUTO: 10 FL (ref 9.2–11.8)
POTASSIUM SERPL-SCNC: 4.5 MMOL/L (ref 3.5–5.5)
PROT SERPL-MCNC: 6.9 G/DL (ref 6.4–8.2)
PROTHROMBIN TIME: 13.7 SEC (ref 11.9–14.9)
RBC # BLD AUTO: 5.13 M/UL (ref 4.35–5.65)
SODIUM SERPL-SCNC: 138 MMOL/L (ref 136–145)
WBC # BLD AUTO: 7.9 K/UL (ref 4.6–13.2)

## 2024-10-08 PROCEDURE — 36415 COLL VENOUS BLD VENIPUNCTURE: CPT

## 2024-10-08 PROCEDURE — 85610 PROTHROMBIN TIME: CPT

## 2024-10-08 PROCEDURE — 85025 COMPLETE CBC W/AUTO DIFF WBC: CPT

## 2024-10-08 PROCEDURE — 80053 COMPREHEN METABOLIC PANEL: CPT

## 2024-10-09 LAB
FAX TO NUMBER: NORMAL
TEST RESULTS FAXED TO: NORMAL

## 2024-10-21 RX ORDER — TRIAMCINOLONE ACETONIDE 1 MG/G
CREAM TOPICAL 2 TIMES DAILY
COMMUNITY

## 2024-10-21 RX ORDER — VALACYCLOVIR HYDROCHLORIDE 1 G/1
1000 TABLET, FILM COATED ORAL EVERY 8 HOURS PRN
COMMUNITY

## 2024-10-21 RX ORDER — NITROGLYCERIN 0.4 MG/1
0.4 TABLET SUBLINGUAL EVERY 5 MIN PRN
COMMUNITY

## 2024-10-21 RX ORDER — AMLODIPINE BESYLATE 5 MG/1
5 TABLET ORAL NIGHTLY
COMMUNITY

## 2024-10-22 ENCOUNTER — HOSPITAL ENCOUNTER (OUTPATIENT)
Facility: HOSPITAL | Age: 74
Setting detail: OUTPATIENT SURGERY
Discharge: HOME | End: 2024-10-22
Attending: INTERNAL MEDICINE | Admitting: INTERNAL MEDICINE
Payer: MEDICARE

## 2024-10-22 VITALS
SYSTOLIC BLOOD PRESSURE: 106 MMHG | DIASTOLIC BLOOD PRESSURE: 61 MMHG | WEIGHT: 173 LBS | TEMPERATURE: 98.2 F | RESPIRATION RATE: 18 BRPM | HEIGHT: 70 IN | BODY MASS INDEX: 24.77 KG/M2 | HEART RATE: 78 BPM | OXYGEN SATURATION: 94 %

## 2024-10-22 DIAGNOSIS — I25.10 CAD (CORONARY ARTERY DISEASE): ICD-10-CM

## 2024-10-22 DIAGNOSIS — R94.39 ABNORMAL STRESS TEST: ICD-10-CM

## 2024-10-22 PROCEDURE — 2709999900 HC NON-CHARGEABLE SUPPLY: Performed by: INTERNAL MEDICINE

## 2024-10-22 PROCEDURE — 6360000002 HC RX W HCPCS: Performed by: INTERNAL MEDICINE

## 2024-10-22 PROCEDURE — 6360000004 HC RX CONTRAST MEDICATION: Performed by: INTERNAL MEDICINE

## 2024-10-22 PROCEDURE — C1769 GUIDE WIRE: HCPCS | Performed by: INTERNAL MEDICINE

## 2024-10-22 PROCEDURE — 2500000003 HC RX 250 WO HCPCS: Performed by: INTERNAL MEDICINE

## 2024-10-22 PROCEDURE — 99152 MOD SED SAME PHYS/QHP 5/>YRS: CPT | Performed by: INTERNAL MEDICINE

## 2024-10-22 PROCEDURE — 2580000003 HC RX 258: Performed by: INTERNAL MEDICINE

## 2024-10-22 PROCEDURE — 93458 L HRT ARTERY/VENTRICLE ANGIO: CPT | Performed by: INTERNAL MEDICINE

## 2024-10-22 PROCEDURE — C1894 INTRO/SHEATH, NON-LASER: HCPCS | Performed by: INTERNAL MEDICINE

## 2024-10-22 PROCEDURE — 99153 MOD SED SAME PHYS/QHP EA: CPT | Performed by: INTERNAL MEDICINE

## 2024-10-22 PROCEDURE — 6370000000 HC RX 637 (ALT 250 FOR IP): Performed by: INTERNAL MEDICINE

## 2024-10-22 RX ORDER — IOPAMIDOL 612 MG/ML
INJECTION, SOLUTION INTRAVASCULAR PRN
Status: DISCONTINUED | OUTPATIENT
Start: 2024-10-22 | End: 2024-10-22 | Stop reason: HOSPADM

## 2024-10-22 RX ORDER — SODIUM CHLORIDE 9 MG/ML
INJECTION, SOLUTION INTRAVENOUS CONTINUOUS
Status: DISPENSED | OUTPATIENT
Start: 2024-10-22 | End: 2024-10-22

## 2024-10-22 RX ORDER — ATROPINE SULFATE 0.1 MG/ML
INJECTION INTRAVENOUS PRN
Status: DISCONTINUED | OUTPATIENT
Start: 2024-10-22 | End: 2024-10-22 | Stop reason: HOSPADM

## 2024-10-22 RX ORDER — SODIUM CHLORIDE 0.9 % (FLUSH) 0.9 %
5-40 SYRINGE (ML) INJECTION EVERY 12 HOURS SCHEDULED
Status: DISCONTINUED | OUTPATIENT
Start: 2024-10-22 | End: 2024-10-22 | Stop reason: HOSPADM

## 2024-10-22 RX ORDER — VERAPAMIL HYDROCHLORIDE 2.5 MG/ML
INJECTION, SOLUTION INTRAVENOUS PRN
Status: DISCONTINUED | OUTPATIENT
Start: 2024-10-22 | End: 2024-10-22 | Stop reason: HOSPADM

## 2024-10-22 RX ORDER — SODIUM CHLORIDE 0.9 % (FLUSH) 0.9 %
5-40 SYRINGE (ML) INJECTION PRN
Status: DISCONTINUED | OUTPATIENT
Start: 2024-10-22 | End: 2024-10-22 | Stop reason: HOSPADM

## 2024-10-22 RX ORDER — ACETAMINOPHEN 325 MG/1
650 TABLET ORAL EVERY 4 HOURS PRN
Status: DISCONTINUED | OUTPATIENT
Start: 2024-10-22 | End: 2024-10-22 | Stop reason: HOSPADM

## 2024-10-22 RX ORDER — SODIUM CHLORIDE 9 MG/ML
INJECTION, SOLUTION INTRAVENOUS PRN
Status: DISCONTINUED | OUTPATIENT
Start: 2024-10-22 | End: 2024-10-22 | Stop reason: HOSPADM

## 2024-10-22 RX ORDER — LIDOCAINE HYDROCHLORIDE 10 MG/ML
INJECTION, SOLUTION INFILTRATION; PERINEURAL PRN
Status: DISCONTINUED | OUTPATIENT
Start: 2024-10-22 | End: 2024-10-22 | Stop reason: HOSPADM

## 2024-10-22 RX ORDER — HEPARIN SODIUM 1000 [USP'U]/ML
INJECTION, SOLUTION INTRAVENOUS; SUBCUTANEOUS PRN
Status: DISCONTINUED | OUTPATIENT
Start: 2024-10-22 | End: 2024-10-22 | Stop reason: HOSPADM

## 2024-10-22 RX ORDER — PNV NO.95/FERROUS FUM/FOLIC AC 28MG-0.8MG
1 TABLET ORAL DAILY
COMMUNITY

## 2024-10-22 RX ORDER — ASPIRIN 81 MG/1
81 TABLET, CHEWABLE ORAL ONCE
Status: COMPLETED | OUTPATIENT
Start: 2024-10-22 | End: 2024-10-22

## 2024-10-22 RX ORDER — MIDAZOLAM HYDROCHLORIDE 1 MG/ML
INJECTION INTRAMUSCULAR; INTRAVENOUS PRN
Status: DISCONTINUED | OUTPATIENT
Start: 2024-10-22 | End: 2024-10-22 | Stop reason: HOSPADM

## 2024-10-22 RX ADMIN — SODIUM CHLORIDE: 9 INJECTION, SOLUTION INTRAVENOUS at 09:15

## 2024-10-22 NOTE — BRIEF OP NOTE
Brief Postoperative Note      Patient: Tad Nguyen  YOB: 1950  MRN: [id number]    Date of Procedure: 10/22/2024    Pre-Op Diagnosis Codes:      * CAD (coronary artery disease) [I25.10]     * Abnormal stress test [R94.39]    Post-Op Diagnosis: CAD       Procedure(s):  Left heart cath / coronary angiography    Surgeon(s):  Akash Altman MD    Assistant:  * No surgical staff found *    Anesthesia: IV Sedation    Estimated Blood Loss (mL): Minimal    Complications: None    Specimens:   * No specimens in log *    Implants:  * No implants in log *      Drains: * No LDAs found *    Findings:  Infection Present At Time Of Surgery (PATOS) (choose all levels that have infection present):  No infection present  Other Findings:   LHC and Coronary angiogram done via left radial approach.     Coronary angiogram revealed instent stenosis of ramus stent 100%    LV gram was not done. LVEDP 7 mm hg. No significant gradient on pull back.     Patient tolerated procedure well.     Scant blood loss.  No complications.  No specimen removed.     Recommendation:    Medication considered:   Aspirin, beta blocker, ACEI, Nitrates and statin     CAD risk factor education  Diet education  Control cholesterol  Blood pressure control  Exercise education: Age and functional status appropriate.      Electronically signed by Akash Altman MD on 10/22/2024 at 12:16 PM

## 2024-10-22 NOTE — INTERVAL H&P NOTE
Update History & Physical    The patient's History and Physical of October 8, 2024 was reviewed with the patient and I examined the patient. There was no change. The surgical site was confirmed by the patient and me.     Plan: The risks, benefits, expected outcome, and alternative to the recommended procedure have been discussed with the patient. Patient understands and wants to proceed with the procedure.     Electronically signed by Akash Altman MD on 10/22/2024 at 11:03 AM

## 2024-10-22 NOTE — PROGRESS NOTES
Prepped & ready for procedure.   1207 Back from procedure. TR band intact to left radial, no bleeding or swelling. Armboard in use. Diet given. Wife at bedside.   1336 TR band released, sterile 2x2 & tegaderm applied. No bleeding or swelling, Armboard in use.   1445 Discharged instructions reviewed with pt & wife. Assisted up to bathroom, voided.   1515 Dr. Altman in.   1535 Discharged home via w/c in stable condition in care of wife. Denies pain. Dressing intact & dry to left radial.

## 2024-10-22 NOTE — POST SEDATION
Sedation Post Procedure Note    Patient Name: Tad Nguyen   YOB: 1950  Room/Bed: [facility] ([facility]  Medical Record Number: [id number]  Date: 10/22/2024   Time: 12:15 PM         Physicians/Assistants: Akash Altman MD, MD    Procedure Performed:  LHC, coronary angiogram     Post-Sedation Vital Signs:  Vitals:    10/22/24 1205   BP: 119/69   Pulse:    Resp:    Temp:    SpO2:       Vital signs were reviewed and were stable after the procedure (see flow sheet for vitals)            Post-Sedation Exam: Lungs: clear and Cardiovascular: normal           Complications: none    Electronically signed by Akash Altman MD on 10/22/2024 at 12:15 PM

## 2024-10-22 NOTE — DISCHARGE SUMMARY
Discharge Summary    Patient: Tad Nguyen MRN: [id number]  CSN: [id number]    YOB: 1950  Age: 74 y.o.  Sex: male    DOA: 10/22/2024 LOS:  LOS: 0 days   Discharge Date:      Primary Care Provider:  Hegla Whiting PA    Admission Diagnoses: CAD (coronary artery disease) [I25.10]  Abnormal stress test [R94.39]    Discharge Diagnoses:  CAD    Discharge Condition: Stable    Discharge Medications:        Medication List        START taking these medications      atorvastatin 40 MG tablet  Commonly known as: LIPITOR            CONTINUE taking these medications      acetaminophen 650 MG extended release tablet  Commonly known as: TYLENOL     amLODIPine 5 MG tablet  Commonly known as: NORVASC     apixaban 2.5 MG Tabs tablet  Commonly known as: ELIQUIS     aspirin 81 MG EC tablet     Cholecalciferol 50 MCG (2000 UT) Tabs     esomeprazole 40 MG delayed release capsule  Commonly known as: NEXIUM     Iron 325 (65 Fe) MG Tabs     melatonin 5 MG Tabs tablet     nitroGLYCERIN 0.4 MG SL tablet  Commonly known as: NITROSTAT     NONFORMULARY     NONFORMULARY     ranolazine 500 MG extended release tablet  Commonly known as: RANEXA     triamcinolone 0.1 % cream  Commonly known as: KENALOG     valACYclovir 1 g tablet  Commonly known as: VALTREX            STOP taking these medications      clopidogrel 75 MG tablet  Commonly known as: PLAVIX            ASK your doctor about these medications      cyanocobalamin 500 MCG tablet              Procedures : LHC, coronary angiogram     Consults: None      PHYSICAL EXAM   Visit Vitals  /69   Pulse 75   Temp 97.9 °F (36.6 °C) (Oral)   Resp 16   Ht 1.778 m (5' 10\")   Wt 78.5 kg (173 lb)   SpO2 99%   BMI 24.82 kg/m²     General: Awake, cooperative, no acute distress    HEENT: NC, Atraumatic.  PERRLA, EOMI. Anicteric sclerae.  Lungs:  CTA Bilaterally. No Wheezing/Rhonchi/Rales.  Heart:  Regular  rhythm,  No murmur, No Rubs, No Gallops  Abdomen: Soft, Non distended, Non

## 2024-10-22 NOTE — DISCHARGE INSTRUCTIONS
Cardiac Catheterization/Angiography Discharge Instructions    *Check the puncture site frequently for swelling or bleeding. If you see any bleeding, lie down and apply pressure over the area with a clean towel or washcloth. Notify your doctor for any redness, swelling, drainage or oozing from the puncture site. Notify your doctor for any fever or chills.    *If the leg or arm with the puncture becomes cold, numb or painful, call Dr Altman     *Activity should be limited for the next 48 hours. Climb stairs as little as possible and avoid any stooping, bending or strenuous activity for 48 hours. No heavy lifting (anything over 10 pounds) for three days.    *Do not drive for 24 hours.    *You may resume your usual diet. Drink more fluids than usual.    *Have a responsible person drive you home and stay with you for at least 24 hours after your heart catheterization/angiography.    *You may remove the bandage from your Left and Arm in 24 hours. You may shower in 24 hours. No tub baths, hot tubs or swimming for one week. Do not place any lotions, creams, powders, ointments over the puncture site for one week. You may place a clean band-aid over the puncture site each day for 5 days. Change this daily.       Sedation: Care Instructions  Overview     Sedation is the use of medicine to help you feel relaxed and comfortable during a procedure. The medicine is usually given in a vein (by I.V.). It may be used with numbing medicines.  There are different levels of sedation. They range from being awake but relaxed to being completely unconscious. Which level you have will depend on the procedure and your needs. You will be watched closely by a doctor or nurse during sedation.  Common side effects from sedation include:  Feeling sleepy or tired. (Your doctors and nurses will make sure you aren't too sleepy to go home.)  Feeling dizzy or unsteady.  Follow-up care is a key part of your treatment and safety.

## 2024-10-23 LAB — ECHO BSA: 1.97 M2

## 2024-11-11 ENCOUNTER — HOSPITAL ENCOUNTER (OUTPATIENT)
Facility: HOSPITAL | Age: 74
Discharge: HOME | End: 2024-11-13
Attending: INTERNAL MEDICINE
Payer: MEDICARE

## 2024-11-11 DIAGNOSIS — I82.512 CHRONIC EMBOLISM AND THROMBOSIS OF LEFT FEMORAL VEIN (HCC): ICD-10-CM

## 2024-11-11 PROCEDURE — 93970 EXTREMITY STUDY: CPT

## 2025-05-09 ENCOUNTER — HOSPITAL ENCOUNTER (EMERGENCY)
Facility: HOSPITAL | Age: 75
Discharge: HOME | End: 2025-05-09
Payer: MEDICARE

## 2025-05-09 ENCOUNTER — APPOINTMENT (OUTPATIENT)
Facility: HOSPITAL | Age: 75
End: 2025-05-09
Payer: MEDICARE

## 2025-05-09 VITALS
SYSTOLIC BLOOD PRESSURE: 120 MMHG | WEIGHT: 171 LBS | OXYGEN SATURATION: 99 % | TEMPERATURE: 99 F | DIASTOLIC BLOOD PRESSURE: 77 MMHG | RESPIRATION RATE: 21 BRPM | BODY MASS INDEX: 24.48 KG/M2 | HEART RATE: 61 BPM | HEIGHT: 70 IN

## 2025-05-09 DIAGNOSIS — S20.211A RIB CONTUSION, RIGHT, INITIAL ENCOUNTER: Primary | ICD-10-CM

## 2025-05-09 LAB
EKG ATRIAL RATE: 58 BPM
EKG DIAGNOSIS: NORMAL
EKG P AXIS: 27 DEGREES
EKG P-R INTERVAL: 270 MS
EKG Q-T INTERVAL: 406 MS
EKG QRS DURATION: 72 MS
EKG QTC CALCULATION (BAZETT): 398 MS
EKG R AXIS: 8 DEGREES
EKG T AXIS: 110 DEGREES
EKG VENTRICULAR RATE: 58 BPM

## 2025-05-09 PROCEDURE — 71101 X-RAY EXAM UNILAT RIBS/CHEST: CPT

## 2025-05-09 PROCEDURE — 93005 ELECTROCARDIOGRAM TRACING: CPT | Performed by: EMERGENCY MEDICINE

## 2025-05-09 PROCEDURE — 99284 EMERGENCY DEPT VISIT MOD MDM: CPT

## 2025-05-09 PROCEDURE — 93010 ELECTROCARDIOGRAM REPORT: CPT | Performed by: INTERNAL MEDICINE

## 2025-05-09 NOTE — ED PROVIDER NOTES
[facility]  EMERGENCY DEPARTMENT ENCOUNTER       Pt Name: Tad Nguyen  MRN: [id number]  Birthdate 1950  Date of evaluation: 5/9/2025  PCP: Helga Whiting PA  Note Started: 12:16 PM 5/9/25     CHIEF COMPLAINT       Chief Complaint   Patient presents with    Chest Pain    Rib Pain (injury)        HISTORY OF PRESENT ILLNESS: 1 or more elements      History From: Patient  HPI Limitations: None  Chronic Conditions: DVT, GERD, MI, HLD   Social Determinants affecting Dx or Tx: none      Tad Nguyen is a 75 y.o. male who presents to ED c/o right anterior chest pain.  Patient states 8 days ago he was walking his dog when he tripped over the dog and fell forward landing with his right arm against his chest.  He states since then he has had pain in a specific spot just to the right of his sternum.  He is taking Tylenol and Aleve and use a lidocaine patch which has provided relief.  The pain is not constant but is present if he moves a certain way coughs or sneezes or presses the right side of his chest.  He denies shortness of breath, any other injuries or complaints.  He has a history of CAD status post MI with stent placement in 2022, had subsequent catheterization in December 2020 for and was told that his stent was occluded but he had collateral flow and no intervention was done.  Patient states his MI presented with extreme diaphoresis and fatigue, which he has not experienced at all in the last week.  He feels that this is related to his injury, not his heart.     Nursing Notes were all reviewed and agreed with or any disagreements were addressed in the HPI.    PAST HISTORY     Past Medical History:  Past Medical History:   Diagnosis Date    Arrhythmia     Iregular heartbeat    BPH (benign prostatic hyperplasia)     DVT (deep venous thrombosis) (HCC)     GERD (gastroesophageal reflux disease)     Hyperlipemia     MI (myocardial infarction) (HCC) 08/2022       Past Surgical

## 2025-05-09 NOTE — ED TRIAGE NOTES
Pt in ED with c/o right sided chest pain. Pt states he was tripped by his dog last Friday and has been having pain when coughing, and sitting up in the bed

## (undated) DEVICE — ELECTRODES PAIR QUIK COMBO CONN RTS DEFIB

## (undated) DEVICE — SPLINT WR VELC FOAM NEUT POS DISP FOR RAD ART ACC SFT STRP

## (undated) DEVICE — SENSOR PLSE OXMTR AD CBL L36IN ADH FRM FIT SPO2 DISP

## (undated) DEVICE — GLIDESHEATH SLENDER STAINLESS STEEL KIT: Brand: GLIDESHEATH SLENDER

## (undated) DEVICE — PROCEDURE KIT FLUID MGMT 10 FR CUST MAINFOLD

## (undated) DEVICE — STOPCOCK TRNSDUC 500PSI 3 W ROT M LUER LT BLU OFF HNDL R

## (undated) DEVICE — DRAPE EP LT SUBCLAV ENTRY SHLD SORBX

## (undated) DEVICE — 20/30 PRIORITY PACK ACCESSORY KIT INCLUDES INDEFLATOR INFLATION DEVICE 30 ATM 20 CC / ROTATINGHEMOSTATIC VALVE .096 " / GUIDE WIRE INTRODUCER / TORQUE DEVICE: Brand: INDEFLATOR

## (undated) DEVICE — Device

## (undated) DEVICE — CATH GUID COR EB35 5FR 100CM -- LAUNCHER

## (undated) DEVICE — SHIELD RAD 14X16 IN W/ SCOOP ABSORBER

## (undated) DEVICE — BAND COMPR L24CM REG CLR PLAS HEMSTAT EXT HK AND LOOP RETEN

## (undated) DEVICE — CATHETER DIAG 5FR L100CM LUMN ID0.047IN JL3.5 CRV 0 SIDE H

## (undated) DEVICE — ANGIOGRAPHIC CATHETER: Brand: EXPO™

## (undated) DEVICE — DRAPE,ANGIO,BRACH,STERILE,38X44: Brand: MEDLINE

## (undated) DEVICE — TUBING PRSS MON L24IN PVC RIG NONEXPANDING M TO FEM CONN

## (undated) DEVICE — GUIDEWIRE VASC L260CM DIA0.035IN RAD 3MM J TIP L7CM PTFE

## (undated) DEVICE — COPILOT BLEEDBACK CONTROL VALVE: Brand: COPILOT

## (undated) DEVICE — CATH BLLN DIL 2.5 X12MM RX -- EUPHORA

## (undated) DEVICE — CATHETER DIAG 5FR L100CM LUMN ID0.047IN JL4 CRV 0 SIDE H

## (undated) DEVICE — RUNTHROUGH NS EXTRA FLOPPY PTCA GUIDEWIRE: Brand: RUNTHROUGH

## (undated) DEVICE — PRESSURE MONITORING SET: Brand: TRUWAVE

## (undated) DEVICE — PACK PROCEDURE SURG CATH CUST

## (undated) DEVICE — GUIDEWIRE VASC L260CM DIA0.035IN TIP L3MM STD EXCHG PTFE J

## (undated) DEVICE — CATHETER DIAG 5FR L100CM JR3.5 CRV SZ DBL BRAID WIRE SFT

## (undated) DEVICE — SPLINT WR POS F/ARTERIAL ACC -- BX/10

## (undated) DEVICE — CATH GUID COR EB35 6FR 100CM -- LAUNCHER

## (undated) DEVICE — CATH ANGI BLLN DIL 3.5X12MM -- NC EUPHORA